# Patient Record
Sex: MALE | Race: WHITE | NOT HISPANIC OR LATINO | URBAN - METROPOLITAN AREA
[De-identification: names, ages, dates, MRNs, and addresses within clinical notes are randomized per-mention and may not be internally consistent; named-entity substitution may affect disease eponyms.]

---

## 2023-10-17 ENCOUNTER — INPATIENT (INPATIENT)
Facility: HOSPITAL | Age: 38
LOS: 4 days | Discharge: HOME | DRG: 897 | End: 2023-10-22
Attending: FAMILY MEDICINE | Admitting: STUDENT IN AN ORGANIZED HEALTH CARE EDUCATION/TRAINING PROGRAM
Payer: COMMERCIAL

## 2023-10-17 VITALS
SYSTOLIC BLOOD PRESSURE: 140 MMHG | HEART RATE: 129 BPM | TEMPERATURE: 98 F | HEIGHT: 68 IN | WEIGHT: 169.98 LBS | DIASTOLIC BLOOD PRESSURE: 84 MMHG | RESPIRATION RATE: 16 BRPM | OXYGEN SATURATION: 94 %

## 2023-10-17 LAB
ALBUMIN SERPL ELPH-MCNC: 4.4 G/DL — SIGNIFICANT CHANGE UP (ref 3.3–5.2)
ALBUMIN SERPL ELPH-MCNC: 4.4 G/DL — SIGNIFICANT CHANGE UP (ref 3.3–5.2)
ALP SERPL-CCNC: 109 U/L — SIGNIFICANT CHANGE UP (ref 40–120)
ALP SERPL-CCNC: 109 U/L — SIGNIFICANT CHANGE UP (ref 40–120)
ALT FLD-CCNC: 48 U/L — HIGH
ALT FLD-CCNC: 48 U/L — HIGH
ANION GAP SERPL CALC-SCNC: 24 MMOL/L — HIGH (ref 5–17)
ANION GAP SERPL CALC-SCNC: 24 MMOL/L — HIGH (ref 5–17)
APAP SERPL-MCNC: <3 UG/ML — LOW (ref 10–26)
APAP SERPL-MCNC: <3 UG/ML — LOW (ref 10–26)
APPEARANCE UR: CLEAR — SIGNIFICANT CHANGE UP
APPEARANCE UR: CLEAR — SIGNIFICANT CHANGE UP
AST SERPL-CCNC: 110 U/L — HIGH
AST SERPL-CCNC: 110 U/L — HIGH
BACTERIA # UR AUTO: ABNORMAL
BACTERIA # UR AUTO: ABNORMAL
BASOPHILS # BLD AUTO: 0.01 K/UL — SIGNIFICANT CHANGE UP (ref 0–0.2)
BASOPHILS # BLD AUTO: 0.01 K/UL — SIGNIFICANT CHANGE UP (ref 0–0.2)
BASOPHILS NFR BLD AUTO: 0.2 % — SIGNIFICANT CHANGE UP (ref 0–2)
BASOPHILS NFR BLD AUTO: 0.2 % — SIGNIFICANT CHANGE UP (ref 0–2)
BILIRUB SERPL-MCNC: 0.6 MG/DL — SIGNIFICANT CHANGE UP (ref 0.4–2)
BILIRUB SERPL-MCNC: 0.6 MG/DL — SIGNIFICANT CHANGE UP (ref 0.4–2)
BILIRUB UR-MCNC: NEGATIVE — SIGNIFICANT CHANGE UP
BILIRUB UR-MCNC: NEGATIVE — SIGNIFICANT CHANGE UP
BUN SERPL-MCNC: 9.9 MG/DL — SIGNIFICANT CHANGE UP (ref 8–20)
BUN SERPL-MCNC: 9.9 MG/DL — SIGNIFICANT CHANGE UP (ref 8–20)
CALCIUM SERPL-MCNC: 8.3 MG/DL — LOW (ref 8.4–10.5)
CALCIUM SERPL-MCNC: 8.3 MG/DL — LOW (ref 8.4–10.5)
CHLORIDE SERPL-SCNC: 87 MMOL/L — LOW (ref 96–108)
CHLORIDE SERPL-SCNC: 87 MMOL/L — LOW (ref 96–108)
CO2 SERPL-SCNC: 21 MMOL/L — LOW (ref 22–29)
CO2 SERPL-SCNC: 21 MMOL/L — LOW (ref 22–29)
COLOR SPEC: YELLOW — SIGNIFICANT CHANGE UP
COLOR SPEC: YELLOW — SIGNIFICANT CHANGE UP
CREAT SERPL-MCNC: 0.79 MG/DL — SIGNIFICANT CHANGE UP (ref 0.5–1.3)
CREAT SERPL-MCNC: 0.79 MG/DL — SIGNIFICANT CHANGE UP (ref 0.5–1.3)
DIFF PNL FLD: ABNORMAL
DIFF PNL FLD: ABNORMAL
EGFR: 117 ML/MIN/1.73M2 — SIGNIFICANT CHANGE UP
EGFR: 117 ML/MIN/1.73M2 — SIGNIFICANT CHANGE UP
EOSINOPHIL # BLD AUTO: 0 K/UL — SIGNIFICANT CHANGE UP (ref 0–0.5)
EOSINOPHIL # BLD AUTO: 0 K/UL — SIGNIFICANT CHANGE UP (ref 0–0.5)
EOSINOPHIL NFR BLD AUTO: 0 % — SIGNIFICANT CHANGE UP (ref 0–6)
EOSINOPHIL NFR BLD AUTO: 0 % — SIGNIFICANT CHANGE UP (ref 0–6)
EPI CELLS # UR: SIGNIFICANT CHANGE UP
EPI CELLS # UR: SIGNIFICANT CHANGE UP
ETHANOL SERPL-MCNC: 336 MG/DL — HIGH (ref 0–9)
ETHANOL SERPL-MCNC: 336 MG/DL — HIGH (ref 0–9)
GLUCOSE SERPL-MCNC: 115 MG/DL — HIGH (ref 70–99)
GLUCOSE SERPL-MCNC: 115 MG/DL — HIGH (ref 70–99)
GLUCOSE UR QL: NEGATIVE MG/DL — SIGNIFICANT CHANGE UP
GLUCOSE UR QL: NEGATIVE MG/DL — SIGNIFICANT CHANGE UP
HCT VFR BLD CALC: 37.9 % — LOW (ref 39–50)
HCT VFR BLD CALC: 37.9 % — LOW (ref 39–50)
HGB BLD-MCNC: 13.6 G/DL — SIGNIFICANT CHANGE UP (ref 13–17)
HGB BLD-MCNC: 13.6 G/DL — SIGNIFICANT CHANGE UP (ref 13–17)
IMM GRANULOCYTES NFR BLD AUTO: 0.2 % — SIGNIFICANT CHANGE UP (ref 0–0.9)
IMM GRANULOCYTES NFR BLD AUTO: 0.2 % — SIGNIFICANT CHANGE UP (ref 0–0.9)
KETONES UR-MCNC: ABNORMAL
KETONES UR-MCNC: ABNORMAL
LEUKOCYTE ESTERASE UR-ACNC: ABNORMAL
LEUKOCYTE ESTERASE UR-ACNC: ABNORMAL
LYMPHOCYTES # BLD AUTO: 1.96 K/UL — SIGNIFICANT CHANGE UP (ref 1–3.3)
LYMPHOCYTES # BLD AUTO: 1.96 K/UL — SIGNIFICANT CHANGE UP (ref 1–3.3)
LYMPHOCYTES # BLD AUTO: 33.2 % — SIGNIFICANT CHANGE UP (ref 13–44)
LYMPHOCYTES # BLD AUTO: 33.2 % — SIGNIFICANT CHANGE UP (ref 13–44)
MCHC RBC-ENTMCNC: 30.8 PG — SIGNIFICANT CHANGE UP (ref 27–34)
MCHC RBC-ENTMCNC: 30.8 PG — SIGNIFICANT CHANGE UP (ref 27–34)
MCHC RBC-ENTMCNC: 35.9 GM/DL — SIGNIFICANT CHANGE UP (ref 32–36)
MCHC RBC-ENTMCNC: 35.9 GM/DL — SIGNIFICANT CHANGE UP (ref 32–36)
MCV RBC AUTO: 85.7 FL — SIGNIFICANT CHANGE UP (ref 80–100)
MCV RBC AUTO: 85.7 FL — SIGNIFICANT CHANGE UP (ref 80–100)
MONOCYTES # BLD AUTO: 0.31 K/UL — SIGNIFICANT CHANGE UP (ref 0–0.9)
MONOCYTES # BLD AUTO: 0.31 K/UL — SIGNIFICANT CHANGE UP (ref 0–0.9)
MONOCYTES NFR BLD AUTO: 5.2 % — SIGNIFICANT CHANGE UP (ref 2–14)
MONOCYTES NFR BLD AUTO: 5.2 % — SIGNIFICANT CHANGE UP (ref 2–14)
NEUTROPHILS # BLD AUTO: 3.62 K/UL — SIGNIFICANT CHANGE UP (ref 1.8–7.4)
NEUTROPHILS # BLD AUTO: 3.62 K/UL — SIGNIFICANT CHANGE UP (ref 1.8–7.4)
NEUTROPHILS NFR BLD AUTO: 61.2 % — SIGNIFICANT CHANGE UP (ref 43–77)
NEUTROPHILS NFR BLD AUTO: 61.2 % — SIGNIFICANT CHANGE UP (ref 43–77)
NITRITE UR-MCNC: NEGATIVE — SIGNIFICANT CHANGE UP
NITRITE UR-MCNC: NEGATIVE — SIGNIFICANT CHANGE UP
PH UR: 6 — SIGNIFICANT CHANGE UP (ref 5–8)
PH UR: 6 — SIGNIFICANT CHANGE UP (ref 5–8)
PLATELET # BLD AUTO: 214 K/UL — SIGNIFICANT CHANGE UP (ref 150–400)
PLATELET # BLD AUTO: 214 K/UL — SIGNIFICANT CHANGE UP (ref 150–400)
POTASSIUM SERPL-MCNC: 4.3 MMOL/L — SIGNIFICANT CHANGE UP (ref 3.5–5.3)
POTASSIUM SERPL-MCNC: 4.3 MMOL/L — SIGNIFICANT CHANGE UP (ref 3.5–5.3)
POTASSIUM SERPL-SCNC: 4.3 MMOL/L — SIGNIFICANT CHANGE UP (ref 3.5–5.3)
POTASSIUM SERPL-SCNC: 4.3 MMOL/L — SIGNIFICANT CHANGE UP (ref 3.5–5.3)
PROT SERPL-MCNC: 7.8 G/DL — SIGNIFICANT CHANGE UP (ref 6.6–8.7)
PROT SERPL-MCNC: 7.8 G/DL — SIGNIFICANT CHANGE UP (ref 6.6–8.7)
PROT UR-MCNC: 500 MG/DL
PROT UR-MCNC: 500 MG/DL
RBC # BLD: 4.42 M/UL — SIGNIFICANT CHANGE UP (ref 4.2–5.8)
RBC # BLD: 4.42 M/UL — SIGNIFICANT CHANGE UP (ref 4.2–5.8)
RBC # FLD: 13.3 % — SIGNIFICANT CHANGE UP (ref 10.3–14.5)
RBC # FLD: 13.3 % — SIGNIFICANT CHANGE UP (ref 10.3–14.5)
RBC CASTS # UR COMP ASSIST: ABNORMAL /HPF (ref 0–4)
RBC CASTS # UR COMP ASSIST: ABNORMAL /HPF (ref 0–4)
SALICYLATES SERPL-MCNC: <0.6 MG/DL — LOW (ref 10–20)
SALICYLATES SERPL-MCNC: <0.6 MG/DL — LOW (ref 10–20)
SODIUM SERPL-SCNC: 132 MMOL/L — LOW (ref 135–145)
SODIUM SERPL-SCNC: 132 MMOL/L — LOW (ref 135–145)
SP GR SPEC: 1.02 — SIGNIFICANT CHANGE UP (ref 1.01–1.02)
SP GR SPEC: 1.02 — SIGNIFICANT CHANGE UP (ref 1.01–1.02)
UROBILINOGEN FLD QL: NEGATIVE MG/DL — SIGNIFICANT CHANGE UP
UROBILINOGEN FLD QL: NEGATIVE MG/DL — SIGNIFICANT CHANGE UP
WBC # BLD: 5.91 K/UL — SIGNIFICANT CHANGE UP (ref 3.8–10.5)
WBC # BLD: 5.91 K/UL — SIGNIFICANT CHANGE UP (ref 3.8–10.5)
WBC # FLD AUTO: 5.91 K/UL — SIGNIFICANT CHANGE UP (ref 3.8–10.5)
WBC # FLD AUTO: 5.91 K/UL — SIGNIFICANT CHANGE UP (ref 3.8–10.5)
WBC UR QL: SIGNIFICANT CHANGE UP /HPF (ref 0–5)
WBC UR QL: SIGNIFICANT CHANGE UP /HPF (ref 0–5)

## 2023-10-17 PROCEDURE — 99291 CRITICAL CARE FIRST HOUR: CPT

## 2023-10-17 PROCEDURE — 93010 ELECTROCARDIOGRAM REPORT: CPT

## 2023-10-17 RX ORDER — DIAZEPAM 5 MG
10 TABLET ORAL ONCE
Refills: 0 | Status: DISCONTINUED | OUTPATIENT
Start: 2023-10-17 | End: 2023-10-17

## 2023-10-17 RX ORDER — DIAZEPAM 5 MG
10 TABLET ORAL EVERY 4 HOURS
Refills: 0 | Status: DISCONTINUED | OUTPATIENT
Start: 2023-10-17 | End: 2023-10-21

## 2023-10-17 RX ADMIN — Medication 50 MILLIGRAM(S): at 22:16

## 2023-10-17 RX ADMIN — Medication 10 MILLIGRAM(S): at 22:16

## 2023-10-17 RX ADMIN — Medication 50 MILLIGRAM(S): at 19:54

## 2023-10-17 NOTE — ED PROVIDER NOTE - CLINICAL SUMMARY MEDICAL DECISION MAKING FREE TEXT BOX
Pt likely with mild to moderate symptoms related to etoh withdrawal, will check labs, provide medications and reassess to see if patient wants detox Pt likely with mild to moderate symptoms related to etoh withdrawal, will check labs, provide medications and reassess to see if patient wants detox    Patient found to have a significantly elevated alcohol level and persistent symptoms consistent with acute and significant alcohol withdrawal. Patient requiring repeated doses of benzodiazepine to control symptoms. Will admit for further management. Pt likely with mild to moderate symptoms related to etoh withdrawal, will check labs, provide medications and reassess to see if patient wants detox    Patient found to have a significantly elevated alcohol level and persistent symptoms consistent with acute and significant alcohol withdrawal. On repeated evaluations patient with complaints of mild relief with medication and still having anxiety symptoms. Patient requiring repeated doses of benzodiazepine to control symptoms. Will admit for further management.

## 2023-10-17 NOTE — ED PROVIDER NOTE - CONSTITUTIONAL, MLM
anxious appearing, awake, alert, oriented to person, place, time/situation and in no apparent distress. normal... anxious appearing, awake, alert, oriented to person, place, time/situation and very anxious appearing

## 2023-10-17 NOTE — ED PROVIDER NOTE - PROGRESS NOTE DETAILS
Patient states he felt a little better after the initial librium dose but now feels even worse with " a lot of anxiety." He denies any additional symptoms.

## 2023-10-17 NOTE — ED ADULT TRIAGE NOTE - CHIEF COMPLAINT QUOTE
Pt A&Ox4 states "I drank a lot because my wife is cheating." BIBA c/o alcohol intoxication. Patient denies SI/HI. Placed in yellow gown, belongings secured.

## 2023-10-17 NOTE — ED PROVIDER NOTE - OBJECTIVE STATEMENT
37 y/o male presents to the ED stating he believe he has been having panic attack for several hours, notes hx of depression and etoh abuse. Pt endorses that he has several year hx of intermittent etoh abuse and has been on 3 day binge, last drink approximately 5-7 hours PTA. Pt states he feels anxious, dizzy, mildly nauseous, no vomiting. Pt denies fevers, chills, cough, congestion, other substance use. Pt offered detox, at this time declining and requesting meds for panic attack and he will "think about it". Pt denies SI, HI, AH, VH, abnormal sensation.

## 2023-10-17 NOTE — ED ADULT NURSE NOTE - OBJECTIVE STATEMENT
Pt to ED c/o anxiety & depression. Pt has reportedly been drinking an unknown amount of ETOH since Friday. Last drink "sometime today." Pt unable to specify timeframe of last drink today. Pt states he has not been taking his medications including his anxiety medication. Pt denies SI/HI. Pt states "I'm going through a lot. I lost my parents & my brother." Pt tearful. Wife @ bedside. Pt A&Ox4, in NAD @ this time. Pt denies n/v, diaphoresis, pins/needles, auditory/visual hallucinations.

## 2023-10-17 NOTE — ED PROVIDER NOTE - PSYCHIATRIC, MLM
Alert and oriented to person, place, time/situation. anxious, following commands, no si, no hi, no hallucinations

## 2023-10-17 NOTE — ED ADULT NURSE NOTE - NSFALLHARMRISKINTERV_ED_ALL_ED

## 2023-10-18 DIAGNOSIS — F10.239 ALCOHOL DEPENDENCE WITH WITHDRAWAL, UNSPECIFIED: ICD-10-CM
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
## 2023-10-18 LAB
ALBUMIN SERPL ELPH-MCNC: 4.1 G/DL — SIGNIFICANT CHANGE UP (ref 3.3–5.2)
ALBUMIN SERPL ELPH-MCNC: 4.1 G/DL — SIGNIFICANT CHANGE UP (ref 3.3–5.2)
ALP SERPL-CCNC: 106 U/L — SIGNIFICANT CHANGE UP (ref 40–120)
ALP SERPL-CCNC: 106 U/L — SIGNIFICANT CHANGE UP (ref 40–120)
ALT FLD-CCNC: 52 U/L — HIGH
ALT FLD-CCNC: 52 U/L — HIGH
ANION GAP SERPL CALC-SCNC: 18 MMOL/L — HIGH (ref 5–17)
ANION GAP SERPL CALC-SCNC: 18 MMOL/L — HIGH (ref 5–17)
AST SERPL-CCNC: 119 U/L — HIGH
AST SERPL-CCNC: 119 U/L — HIGH
BILIRUB SERPL-MCNC: 1.2 MG/DL — SIGNIFICANT CHANGE UP (ref 0.4–2)
BILIRUB SERPL-MCNC: 1.2 MG/DL — SIGNIFICANT CHANGE UP (ref 0.4–2)
BUN SERPL-MCNC: 8.8 MG/DL — SIGNIFICANT CHANGE UP (ref 8–20)
BUN SERPL-MCNC: 8.8 MG/DL — SIGNIFICANT CHANGE UP (ref 8–20)
CALCIUM SERPL-MCNC: 8 MG/DL — LOW (ref 8.4–10.5)
CALCIUM SERPL-MCNC: 8 MG/DL — LOW (ref 8.4–10.5)
CHLORIDE SERPL-SCNC: 91 MMOL/L — LOW (ref 96–108)
CHLORIDE SERPL-SCNC: 91 MMOL/L — LOW (ref 96–108)
CO2 SERPL-SCNC: 24 MMOL/L — SIGNIFICANT CHANGE UP (ref 22–29)
CO2 SERPL-SCNC: 24 MMOL/L — SIGNIFICANT CHANGE UP (ref 22–29)
CREAT SERPL-MCNC: 0.83 MG/DL — SIGNIFICANT CHANGE UP (ref 0.5–1.3)
CREAT SERPL-MCNC: 0.83 MG/DL — SIGNIFICANT CHANGE UP (ref 0.5–1.3)
EGFR: 115 ML/MIN/1.73M2 — SIGNIFICANT CHANGE UP
EGFR: 115 ML/MIN/1.73M2 — SIGNIFICANT CHANGE UP
GLUCOSE SERPL-MCNC: 86 MG/DL — SIGNIFICANT CHANGE UP (ref 70–99)
GLUCOSE SERPL-MCNC: 86 MG/DL — SIGNIFICANT CHANGE UP (ref 70–99)
MAGNESIUM SERPL-MCNC: 1.9 MG/DL — SIGNIFICANT CHANGE UP (ref 1.6–2.6)
MAGNESIUM SERPL-MCNC: 1.9 MG/DL — SIGNIFICANT CHANGE UP (ref 1.6–2.6)
PHOSPHATE SERPL-MCNC: 3 MG/DL — SIGNIFICANT CHANGE UP (ref 2.4–4.7)
PHOSPHATE SERPL-MCNC: 3 MG/DL — SIGNIFICANT CHANGE UP (ref 2.4–4.7)
POTASSIUM SERPL-MCNC: 3.5 MMOL/L — SIGNIFICANT CHANGE UP (ref 3.5–5.3)
POTASSIUM SERPL-MCNC: 3.5 MMOL/L — SIGNIFICANT CHANGE UP (ref 3.5–5.3)
POTASSIUM SERPL-SCNC: 3.5 MMOL/L — SIGNIFICANT CHANGE UP (ref 3.5–5.3)
POTASSIUM SERPL-SCNC: 3.5 MMOL/L — SIGNIFICANT CHANGE UP (ref 3.5–5.3)
PROT SERPL-MCNC: 6.9 G/DL — SIGNIFICANT CHANGE UP (ref 6.6–8.7)
PROT SERPL-MCNC: 6.9 G/DL — SIGNIFICANT CHANGE UP (ref 6.6–8.7)
SODIUM SERPL-SCNC: 133 MMOL/L — LOW (ref 135–145)
SODIUM SERPL-SCNC: 133 MMOL/L — LOW (ref 135–145)
TROPONIN T SERPL-MCNC: <0.01 NG/ML — SIGNIFICANT CHANGE UP (ref 0–0.06)
TROPONIN T SERPL-MCNC: <0.01 NG/ML — SIGNIFICANT CHANGE UP (ref 0–0.06)

## 2023-10-18 PROCEDURE — 93010 ELECTROCARDIOGRAM REPORT: CPT

## 2023-10-18 PROCEDURE — 99222 1ST HOSP IP/OBS MODERATE 55: CPT

## 2023-10-18 RX ORDER — ONDANSETRON 8 MG/1
4 TABLET, FILM COATED ORAL EVERY 8 HOURS
Refills: 0 | Status: DISCONTINUED | OUTPATIENT
Start: 2023-10-18 | End: 2023-10-22

## 2023-10-18 RX ORDER — PHENOBARBITAL 60 MG
130 TABLET ORAL ONCE
Refills: 0 | Status: DISCONTINUED | OUTPATIENT
Start: 2023-10-18 | End: 2023-10-18

## 2023-10-18 RX ORDER — DIAZEPAM 5 MG
10 TABLET ORAL ONCE
Refills: 0 | Status: DISCONTINUED | OUTPATIENT
Start: 2023-10-18 | End: 2023-10-18

## 2023-10-18 RX ORDER — PANTOPRAZOLE SODIUM 20 MG/1
40 TABLET, DELAYED RELEASE ORAL
Refills: 0 | Status: DISCONTINUED | OUTPATIENT
Start: 2023-10-18 | End: 2023-10-22

## 2023-10-18 RX ORDER — QUETIAPINE FUMARATE 200 MG/1
50 TABLET, FILM COATED ORAL DAILY
Refills: 0 | Status: DISCONTINUED | OUTPATIENT
Start: 2023-10-18 | End: 2023-10-19

## 2023-10-18 RX ORDER — SODIUM CHLORIDE 9 MG/ML
1000 INJECTION INTRAMUSCULAR; INTRAVENOUS; SUBCUTANEOUS ONCE
Refills: 0 | Status: COMPLETED | OUTPATIENT
Start: 2023-10-18 | End: 2023-10-18

## 2023-10-18 RX ORDER — ACETAMINOPHEN 500 MG
650 TABLET ORAL EVERY 6 HOURS
Refills: 0 | Status: DISCONTINUED | OUTPATIENT
Start: 2023-10-18 | End: 2023-10-22

## 2023-10-18 RX ORDER — SODIUM CHLORIDE 9 MG/ML
1000 INJECTION, SOLUTION INTRAVENOUS
Refills: 0 | Status: DISCONTINUED | OUTPATIENT
Start: 2023-10-18 | End: 2023-10-20

## 2023-10-18 RX ORDER — SODIUM CHLORIDE 9 MG/ML
3 INJECTION INTRAMUSCULAR; INTRAVENOUS; SUBCUTANEOUS EVERY 8 HOURS
Refills: 0 | Status: DISCONTINUED | OUTPATIENT
Start: 2023-10-18 | End: 2023-10-22

## 2023-10-18 RX ORDER — METOPROLOL TARTRATE 50 MG
25 TABLET ORAL
Refills: 0 | Status: DISCONTINUED | OUTPATIENT
Start: 2023-10-18 | End: 2023-10-22

## 2023-10-18 RX ORDER — POTASSIUM CHLORIDE 20 MEQ
40 PACKET (EA) ORAL EVERY 4 HOURS
Refills: 0 | Status: COMPLETED | OUTPATIENT
Start: 2023-10-18 | End: 2023-10-18

## 2023-10-18 RX ORDER — LANOLIN ALCOHOL/MO/W.PET/CERES
3 CREAM (GRAM) TOPICAL AT BEDTIME
Refills: 0 | Status: DISCONTINUED | OUTPATIENT
Start: 2023-10-18 | End: 2023-10-22

## 2023-10-18 RX ORDER — THIAMINE MONONITRATE (VIT B1) 100 MG
100 TABLET ORAL DAILY
Refills: 0 | Status: COMPLETED | OUTPATIENT
Start: 2023-10-18 | End: 2023-10-20

## 2023-10-18 RX ORDER — FOLIC ACID 0.8 MG
1 TABLET ORAL DAILY
Refills: 0 | Status: DISCONTINUED | OUTPATIENT
Start: 2023-10-18 | End: 2023-10-22

## 2023-10-18 RX ORDER — INFLUENZA VIRUS VACCINE 15; 15; 15; 15 UG/.5ML; UG/.5ML; UG/.5ML; UG/.5ML
0.5 SUSPENSION INTRAMUSCULAR ONCE
Refills: 0 | Status: DISCONTINUED | OUTPATIENT
Start: 2023-10-18 | End: 2023-10-22

## 2023-10-18 RX ADMIN — Medication 130 MILLIGRAM(S): at 03:23

## 2023-10-18 RX ADMIN — Medication 10 MILLIGRAM(S): at 18:08

## 2023-10-18 RX ADMIN — SODIUM CHLORIDE 3 MILLILITER(S): 9 INJECTION INTRAMUSCULAR; INTRAVENOUS; SUBCUTANEOUS at 11:40

## 2023-10-18 RX ADMIN — SODIUM CHLORIDE 3 MILLILITER(S): 9 INJECTION INTRAMUSCULAR; INTRAVENOUS; SUBCUTANEOUS at 05:56

## 2023-10-18 RX ADMIN — Medication 10 MILLIGRAM(S): at 08:23

## 2023-10-18 RX ADMIN — Medication 10 MILLIGRAM(S): at 03:23

## 2023-10-18 RX ADMIN — Medication 50 MILLIGRAM(S): at 06:21

## 2023-10-18 RX ADMIN — Medication 50 MILLIGRAM(S): at 16:21

## 2023-10-18 RX ADMIN — QUETIAPINE FUMARATE 50 MILLIGRAM(S): 200 TABLET, FILM COATED ORAL at 11:34

## 2023-10-18 RX ADMIN — SODIUM CHLORIDE 3 MILLILITER(S): 9 INJECTION INTRAMUSCULAR; INTRAVENOUS; SUBCUTANEOUS at 21:44

## 2023-10-18 RX ADMIN — Medication 25 MILLIGRAM(S): at 05:43

## 2023-10-18 RX ADMIN — Medication 10 MILLIGRAM(S): at 05:44

## 2023-10-18 RX ADMIN — Medication 40 MILLIEQUIVALENT(S): at 13:52

## 2023-10-18 RX ADMIN — Medication 10 MILLIGRAM(S): at 00:41

## 2023-10-18 RX ADMIN — PANTOPRAZOLE SODIUM 40 MILLIGRAM(S): 20 TABLET, DELAYED RELEASE ORAL at 06:21

## 2023-10-18 RX ADMIN — Medication 25 MILLIGRAM(S): at 16:21

## 2023-10-18 RX ADMIN — Medication 10 MILLIGRAM(S): at 13:52

## 2023-10-18 RX ADMIN — Medication 50 MILLIGRAM(S): at 05:43

## 2023-10-18 RX ADMIN — Medication 50 MILLIGRAM(S): at 11:34

## 2023-10-18 RX ADMIN — Medication 40 MILLIEQUIVALENT(S): at 11:34

## 2023-10-18 RX ADMIN — Medication 10 MILLIGRAM(S): at 22:08

## 2023-10-18 RX ADMIN — SODIUM CHLORIDE 125 MILLILITER(S): 9 INJECTION, SOLUTION INTRAVENOUS at 06:21

## 2023-10-18 RX ADMIN — Medication 1 MILLIGRAM(S): at 08:23

## 2023-10-18 RX ADMIN — SODIUM CHLORIDE 1000 MILLILITER(S): 9 INJECTION INTRAMUSCULAR; INTRAVENOUS; SUBCUTANEOUS at 00:42

## 2023-10-18 RX ADMIN — Medication 1 TABLET(S): at 08:23

## 2023-10-18 RX ADMIN — Medication 10 MILLIGRAM(S): at 19:59

## 2023-10-18 RX ADMIN — Medication 100 MILLIGRAM(S): at 08:23

## 2023-10-18 NOTE — H&P ADULT - ASSESSMENT
37 y/o male with etoh intoxication/withdrawal, etoh ketoacidosis, HTN, Anxiety, GERD    Etoh intoxication/withdrawal:  -Admit to tele  -CIWA protocol with librium taper and PRN valium for CIWA >8  -FA, MVI, Thiamine for possible thiamine deficiency   -Fall/seizure protocol  -Low threshold for MICU eval with signs of withdrawal in setting of elevated etoh level   -PPI  -Check daily lytes  - for [facility] referral   -VC boots while in bed    etoh ketoacidosis:  -Cont. IV/PO intake  -Repeat BMP in AM    HTN:  -DASH diet  -Cont. Metoprolol     Anxiety:  -Cont. Sertraline   -Denies SI/HI    GERD:  -PPI    Discussed with ED staff, Patient

## 2023-10-18 NOTE — PROVIDER CONTACT NOTE (OTHER) - ASSESSMENT
CIWA 8, pt reports anxiety 8/10, last [id number] given at 1808, pt unable to wait until 10pm dose for PRN 10 valium, librium taper ended

## 2023-10-18 NOTE — H&P ADULT - HISTORY OF PRESENT ILLNESS
37 y/o male with hx of etoh use disorder, HTN, Anxiety who was brought in by EMS after his Wife called 911 concerned about him staying in a hotel for the past 3 days drinking excessively. Patient states he had a falling out with his Wife and has been under a lot of stress lately. He states he has been drinking of and off for years trying to cut back recently to only on weekends. States he usually drinks a bottle a wine when he does drink. He denies any hard liquor. Admits to having tremors in the past when he stops drinking. Denies any illicit drug use, has never been in any rehab for his drinking. He denies any hx of SI/HI, and currently is very remorseful and adamantly denies any desire to hurt himself. In the ED he was noted to be tachycardic, and tremulous. Given multiple doses of Valium and Librium with good effect. Initial etoh level 336, and labs c/w dehydration and alcoholic ketoacidosis.

## 2023-10-18 NOTE — PATIENT PROFILE ADULT - FALL HARM RISK - HARM RISK INTERVENTIONS
Assistance with ambulation/Assistance OOB with selected safe patient handling equipment/Communicate Risk of Fall with Harm to all staff/Monitor for mental status changes/Monitor gait and stability/Reinforce activity limits and safety measures with patient and family/Tailored Fall Risk Interventions/Toileting schedule using arm’s reach rule for commode and bathroom/Use of alarms - bed, chair and/or voice tab/Visual Cue: Yellow wristband and red socks/Bed in lowest position, wheels locked, appropriate side rails in place/Call bell, personal items and telephone in reach/Instruct patient to call for assistance before getting out of bed or chair/Non-slip footwear when patient is out of bed/[place] to call system/Physically safe environment - no spills, clutter or unnecessary equipment/Purposeful Proactive Rounding/Room/bathroom lighting operational, light cord in reach

## 2023-10-19 LAB
ALBUMIN SERPL ELPH-MCNC: 4.1 G/DL — SIGNIFICANT CHANGE UP (ref 3.3–5.2)
ALBUMIN SERPL ELPH-MCNC: 4.1 G/DL — SIGNIFICANT CHANGE UP (ref 3.3–5.2)
ALP SERPL-CCNC: 137 U/L — HIGH (ref 40–120)
ALP SERPL-CCNC: 137 U/L — HIGH (ref 40–120)
ALT FLD-CCNC: 62 U/L — HIGH
ALT FLD-CCNC: 62 U/L — HIGH
ANION GAP SERPL CALC-SCNC: 16 MMOL/L — SIGNIFICANT CHANGE UP (ref 5–17)
ANION GAP SERPL CALC-SCNC: 16 MMOL/L — SIGNIFICANT CHANGE UP (ref 5–17)
AST SERPL-CCNC: 144 U/L — HIGH
AST SERPL-CCNC: 144 U/L — HIGH
BILIRUB SERPL-MCNC: 1.3 MG/DL — SIGNIFICANT CHANGE UP (ref 0.4–2)
BILIRUB SERPL-MCNC: 1.3 MG/DL — SIGNIFICANT CHANGE UP (ref 0.4–2)
BUN SERPL-MCNC: 9.5 MG/DL — SIGNIFICANT CHANGE UP (ref 8–20)
BUN SERPL-MCNC: 9.5 MG/DL — SIGNIFICANT CHANGE UP (ref 8–20)
CALCIUM SERPL-MCNC: 8.7 MG/DL — SIGNIFICANT CHANGE UP (ref 8.4–10.5)
CALCIUM SERPL-MCNC: 8.7 MG/DL — SIGNIFICANT CHANGE UP (ref 8.4–10.5)
CHLORIDE SERPL-SCNC: 97 MMOL/L — SIGNIFICANT CHANGE UP (ref 96–108)
CHLORIDE SERPL-SCNC: 97 MMOL/L — SIGNIFICANT CHANGE UP (ref 96–108)
CO2 SERPL-SCNC: 24 MMOL/L — SIGNIFICANT CHANGE UP (ref 22–29)
CO2 SERPL-SCNC: 24 MMOL/L — SIGNIFICANT CHANGE UP (ref 22–29)
CREAT SERPL-MCNC: 0.9 MG/DL — SIGNIFICANT CHANGE UP (ref 0.5–1.3)
CREAT SERPL-MCNC: 0.9 MG/DL — SIGNIFICANT CHANGE UP (ref 0.5–1.3)
EGFR: 112 ML/MIN/1.73M2 — SIGNIFICANT CHANGE UP
EGFR: 112 ML/MIN/1.73M2 — SIGNIFICANT CHANGE UP
GLUCOSE SERPL-MCNC: 115 MG/DL — HIGH (ref 70–99)
GLUCOSE SERPL-MCNC: 115 MG/DL — HIGH (ref 70–99)
HCT VFR BLD CALC: 36.7 % — LOW (ref 39–50)
HCT VFR BLD CALC: 36.7 % — LOW (ref 39–50)
HGB BLD-MCNC: 12.7 G/DL — LOW (ref 13–17)
HGB BLD-MCNC: 12.7 G/DL — LOW (ref 13–17)
MAGNESIUM SERPL-MCNC: 2 MG/DL — SIGNIFICANT CHANGE UP (ref 1.6–2.6)
MAGNESIUM SERPL-MCNC: 2 MG/DL — SIGNIFICANT CHANGE UP (ref 1.6–2.6)
MCHC RBC-ENTMCNC: 30.6 PG — SIGNIFICANT CHANGE UP (ref 27–34)
MCHC RBC-ENTMCNC: 30.6 PG — SIGNIFICANT CHANGE UP (ref 27–34)
MCHC RBC-ENTMCNC: 34.6 GM/DL — SIGNIFICANT CHANGE UP (ref 32–36)
MCHC RBC-ENTMCNC: 34.6 GM/DL — SIGNIFICANT CHANGE UP (ref 32–36)
MCV RBC AUTO: 88.4 FL — SIGNIFICANT CHANGE UP (ref 80–100)
MCV RBC AUTO: 88.4 FL — SIGNIFICANT CHANGE UP (ref 80–100)
PHOSPHATE SERPL-MCNC: 1.7 MG/DL — LOW (ref 2.4–4.7)
PHOSPHATE SERPL-MCNC: 1.7 MG/DL — LOW (ref 2.4–4.7)
PLATELET # BLD AUTO: 146 K/UL — LOW (ref 150–400)
PLATELET # BLD AUTO: 146 K/UL — LOW (ref 150–400)
POTASSIUM SERPL-MCNC: 3.7 MMOL/L — SIGNIFICANT CHANGE UP (ref 3.5–5.3)
POTASSIUM SERPL-MCNC: 3.7 MMOL/L — SIGNIFICANT CHANGE UP (ref 3.5–5.3)
POTASSIUM SERPL-SCNC: 3.7 MMOL/L — SIGNIFICANT CHANGE UP (ref 3.5–5.3)
POTASSIUM SERPL-SCNC: 3.7 MMOL/L — SIGNIFICANT CHANGE UP (ref 3.5–5.3)
PROT SERPL-MCNC: 7.2 G/DL — SIGNIFICANT CHANGE UP (ref 6.6–8.7)
PROT SERPL-MCNC: 7.2 G/DL — SIGNIFICANT CHANGE UP (ref 6.6–8.7)
RBC # BLD: 4.15 M/UL — LOW (ref 4.2–5.8)
RBC # BLD: 4.15 M/UL — LOW (ref 4.2–5.8)
RBC # FLD: 13.2 % — SIGNIFICANT CHANGE UP (ref 10.3–14.5)
RBC # FLD: 13.2 % — SIGNIFICANT CHANGE UP (ref 10.3–14.5)
SODIUM SERPL-SCNC: 137 MMOL/L — SIGNIFICANT CHANGE UP (ref 135–145)
SODIUM SERPL-SCNC: 137 MMOL/L — SIGNIFICANT CHANGE UP (ref 135–145)
WBC # BLD: 4.41 K/UL — SIGNIFICANT CHANGE UP (ref 3.8–10.5)
WBC # BLD: 4.41 K/UL — SIGNIFICANT CHANGE UP (ref 3.8–10.5)
WBC # FLD AUTO: 4.41 K/UL — SIGNIFICANT CHANGE UP (ref 3.8–10.5)
WBC # FLD AUTO: 4.41 K/UL — SIGNIFICANT CHANGE UP (ref 3.8–10.5)

## 2023-10-19 PROCEDURE — 99233 SBSQ HOSP IP/OBS HIGH 50: CPT

## 2023-10-19 PROCEDURE — 93306 TTE W/DOPPLER COMPLETE: CPT | Mod: 26

## 2023-10-19 RX ORDER — TRAZODONE HCL 50 MG
50 TABLET ORAL AT BEDTIME
Refills: 0 | Status: DISCONTINUED | OUTPATIENT
Start: 2023-10-19 | End: 2023-10-22

## 2023-10-19 RX ORDER — SERTRALINE 25 MG/1
50 TABLET, FILM COATED ORAL DAILY
Refills: 0 | Status: DISCONTINUED | OUTPATIENT
Start: 2023-10-19 | End: 2023-10-22

## 2023-10-19 RX ORDER — QUETIAPINE FUMARATE 200 MG/1
50 TABLET, FILM COATED ORAL
Refills: 0 | Status: DISCONTINUED | OUTPATIENT
Start: 2023-10-19 | End: 2023-10-19

## 2023-10-19 RX ORDER — SODIUM,POTASSIUM PHOSPHATES 278-250MG
1 POWDER IN PACKET (EA) ORAL
Refills: 0 | Status: DISCONTINUED | OUTPATIENT
Start: 2023-10-19 | End: 2023-10-20

## 2023-10-19 RX ADMIN — Medication 50 MILLIGRAM(S): at 21:29

## 2023-10-19 RX ADMIN — SODIUM CHLORIDE 3 MILLILITER(S): 9 INJECTION INTRAMUSCULAR; INTRAVENOUS; SUBCUTANEOUS at 11:58

## 2023-10-19 RX ADMIN — PANTOPRAZOLE SODIUM 40 MILLIGRAM(S): 20 TABLET, DELAYED RELEASE ORAL at 05:15

## 2023-10-19 RX ADMIN — QUETIAPINE FUMARATE 50 MILLIGRAM(S): 200 TABLET, FILM COATED ORAL at 14:18

## 2023-10-19 RX ADMIN — Medication 2 MILLIGRAM(S): at 09:31

## 2023-10-19 RX ADMIN — Medication 2 MILLIGRAM(S): at 00:22

## 2023-10-19 RX ADMIN — Medication 1 TABLET(S): at 09:32

## 2023-10-19 RX ADMIN — Medication 1 TABLET(S): at 14:18

## 2023-10-19 RX ADMIN — SODIUM CHLORIDE 75 MILLILITER(S): 9 INJECTION, SOLUTION INTRAVENOUS at 22:43

## 2023-10-19 RX ADMIN — Medication 2 MILLIGRAM(S): at 17:35

## 2023-10-19 RX ADMIN — Medication 25 MILLIGRAM(S): at 05:15

## 2023-10-19 RX ADMIN — Medication 100 MILLIGRAM(S): at 09:31

## 2023-10-19 RX ADMIN — Medication 2 MILLIGRAM(S): at 03:45

## 2023-10-19 RX ADMIN — SODIUM CHLORIDE 3 MILLILITER(S): 9 INJECTION INTRAMUSCULAR; INTRAVENOUS; SUBCUTANEOUS at 07:28

## 2023-10-19 RX ADMIN — Medication 650 MILLIGRAM(S): at 17:35

## 2023-10-19 RX ADMIN — Medication 1 TABLET(S): at 09:31

## 2023-10-19 RX ADMIN — Medication 50 MILLIGRAM(S): at 05:15

## 2023-10-19 RX ADMIN — Medication 10 MILLIGRAM(S): at 02:08

## 2023-10-19 RX ADMIN — Medication 1 MILLIGRAM(S): at 09:31

## 2023-10-19 RX ADMIN — Medication 1 TABLET(S): at 17:44

## 2023-10-19 RX ADMIN — SODIUM CHLORIDE 75 MILLILITER(S): 9 INJECTION, SOLUTION INTRAVENOUS at 17:07

## 2023-10-19 RX ADMIN — SODIUM CHLORIDE 125 MILLILITER(S): 9 INJECTION, SOLUTION INTRAVENOUS at 05:15

## 2023-10-19 RX ADMIN — Medication 650 MILLIGRAM(S): at 18:33

## 2023-10-19 RX ADMIN — Medication 50 MILLIGRAM(S): at 14:17

## 2023-10-19 RX ADMIN — SODIUM CHLORIDE 3 MILLILITER(S): 9 INJECTION INTRAMUSCULAR; INTRAVENOUS; SUBCUTANEOUS at 21:32

## 2023-10-19 RX ADMIN — Medication 25 MILLIGRAM(S): at 17:35

## 2023-10-20 LAB
ALBUMIN SERPL ELPH-MCNC: 3.7 G/DL — SIGNIFICANT CHANGE UP (ref 3.3–5.2)
ALBUMIN SERPL ELPH-MCNC: 3.7 G/DL — SIGNIFICANT CHANGE UP (ref 3.3–5.2)
ALP SERPL-CCNC: 116 U/L — SIGNIFICANT CHANGE UP (ref 40–120)
ALP SERPL-CCNC: 116 U/L — SIGNIFICANT CHANGE UP (ref 40–120)
ALT FLD-CCNC: 53 U/L — HIGH
ALT FLD-CCNC: 53 U/L — HIGH
ANION GAP SERPL CALC-SCNC: 12 MMOL/L — SIGNIFICANT CHANGE UP (ref 5–17)
ANION GAP SERPL CALC-SCNC: 12 MMOL/L — SIGNIFICANT CHANGE UP (ref 5–17)
AST SERPL-CCNC: 82 U/L — HIGH
AST SERPL-CCNC: 82 U/L — HIGH
BASOPHILS # BLD AUTO: 0.01 K/UL — SIGNIFICANT CHANGE UP (ref 0–0.2)
BASOPHILS # BLD AUTO: 0.01 K/UL — SIGNIFICANT CHANGE UP (ref 0–0.2)
BASOPHILS NFR BLD AUTO: 0.2 % — SIGNIFICANT CHANGE UP (ref 0–2)
BASOPHILS NFR BLD AUTO: 0.2 % — SIGNIFICANT CHANGE UP (ref 0–2)
BILIRUB SERPL-MCNC: 0.7 MG/DL — SIGNIFICANT CHANGE UP (ref 0.4–2)
BILIRUB SERPL-MCNC: 0.7 MG/DL — SIGNIFICANT CHANGE UP (ref 0.4–2)
BUN SERPL-MCNC: 7.5 MG/DL — LOW (ref 8–20)
BUN SERPL-MCNC: 7.5 MG/DL — LOW (ref 8–20)
CALCIUM SERPL-MCNC: 8.7 MG/DL — SIGNIFICANT CHANGE UP (ref 8.4–10.5)
CALCIUM SERPL-MCNC: 8.7 MG/DL — SIGNIFICANT CHANGE UP (ref 8.4–10.5)
CHLORIDE SERPL-SCNC: 105 MMOL/L — SIGNIFICANT CHANGE UP (ref 96–108)
CHLORIDE SERPL-SCNC: 105 MMOL/L — SIGNIFICANT CHANGE UP (ref 96–108)
CO2 SERPL-SCNC: 25 MMOL/L — SIGNIFICANT CHANGE UP (ref 22–29)
CO2 SERPL-SCNC: 25 MMOL/L — SIGNIFICANT CHANGE UP (ref 22–29)
CREAT SERPL-MCNC: 0.86 MG/DL — SIGNIFICANT CHANGE UP (ref 0.5–1.3)
CREAT SERPL-MCNC: 0.86 MG/DL — SIGNIFICANT CHANGE UP (ref 0.5–1.3)
EGFR: 114 ML/MIN/1.73M2 — SIGNIFICANT CHANGE UP
EGFR: 114 ML/MIN/1.73M2 — SIGNIFICANT CHANGE UP
EOSINOPHIL # BLD AUTO: 0.17 K/UL — SIGNIFICANT CHANGE UP (ref 0–0.5)
EOSINOPHIL # BLD AUTO: 0.17 K/UL — SIGNIFICANT CHANGE UP (ref 0–0.5)
EOSINOPHIL NFR BLD AUTO: 4.2 % — SIGNIFICANT CHANGE UP (ref 0–6)
EOSINOPHIL NFR BLD AUTO: 4.2 % — SIGNIFICANT CHANGE UP (ref 0–6)
GLUCOSE SERPL-MCNC: 98 MG/DL — SIGNIFICANT CHANGE UP (ref 70–99)
GLUCOSE SERPL-MCNC: 98 MG/DL — SIGNIFICANT CHANGE UP (ref 70–99)
HCT VFR BLD CALC: 36.7 % — LOW (ref 39–50)
HCT VFR BLD CALC: 36.7 % — LOW (ref 39–50)
HGB BLD-MCNC: 12.3 G/DL — LOW (ref 13–17)
HGB BLD-MCNC: 12.3 G/DL — LOW (ref 13–17)
IMM GRANULOCYTES NFR BLD AUTO: 0.2 % — SIGNIFICANT CHANGE UP (ref 0–0.9)
IMM GRANULOCYTES NFR BLD AUTO: 0.2 % — SIGNIFICANT CHANGE UP (ref 0–0.9)
INR BLD: 0.93 RATIO — SIGNIFICANT CHANGE UP (ref 0.85–1.18)
INR BLD: 0.93 RATIO — SIGNIFICANT CHANGE UP (ref 0.85–1.18)
LYMPHOCYTES # BLD AUTO: 1.9 K/UL — SIGNIFICANT CHANGE UP (ref 1–3.3)
LYMPHOCYTES # BLD AUTO: 1.9 K/UL — SIGNIFICANT CHANGE UP (ref 1–3.3)
LYMPHOCYTES # BLD AUTO: 47.3 % — HIGH (ref 13–44)
LYMPHOCYTES # BLD AUTO: 47.3 % — HIGH (ref 13–44)
MAGNESIUM SERPL-MCNC: 2 MG/DL — SIGNIFICANT CHANGE UP (ref 1.6–2.6)
MAGNESIUM SERPL-MCNC: 2 MG/DL — SIGNIFICANT CHANGE UP (ref 1.6–2.6)
MCHC RBC-ENTMCNC: 30.7 PG — SIGNIFICANT CHANGE UP (ref 27–34)
MCHC RBC-ENTMCNC: 30.7 PG — SIGNIFICANT CHANGE UP (ref 27–34)
MCHC RBC-ENTMCNC: 33.5 GM/DL — SIGNIFICANT CHANGE UP (ref 32–36)
MCHC RBC-ENTMCNC: 33.5 GM/DL — SIGNIFICANT CHANGE UP (ref 32–36)
MCV RBC AUTO: 91.5 FL — SIGNIFICANT CHANGE UP (ref 80–100)
MCV RBC AUTO: 91.5 FL — SIGNIFICANT CHANGE UP (ref 80–100)
MONOCYTES # BLD AUTO: 0.35 K/UL — SIGNIFICANT CHANGE UP (ref 0–0.9)
MONOCYTES # BLD AUTO: 0.35 K/UL — SIGNIFICANT CHANGE UP (ref 0–0.9)
MONOCYTES NFR BLD AUTO: 8.7 % — SIGNIFICANT CHANGE UP (ref 2–14)
MONOCYTES NFR BLD AUTO: 8.7 % — SIGNIFICANT CHANGE UP (ref 2–14)
NEUTROPHILS # BLD AUTO: 1.58 K/UL — LOW (ref 1.8–7.4)
NEUTROPHILS # BLD AUTO: 1.58 K/UL — LOW (ref 1.8–7.4)
NEUTROPHILS NFR BLD AUTO: 39.4 % — LOW (ref 43–77)
NEUTROPHILS NFR BLD AUTO: 39.4 % — LOW (ref 43–77)
PHOSPHATE SERPL-MCNC: 4.8 MG/DL — HIGH (ref 2.4–4.7)
PHOSPHATE SERPL-MCNC: 4.8 MG/DL — HIGH (ref 2.4–4.7)
PLATELET # BLD AUTO: 114 K/UL — LOW (ref 150–400)
PLATELET # BLD AUTO: 114 K/UL — LOW (ref 150–400)
POTASSIUM SERPL-MCNC: 3.6 MMOL/L — SIGNIFICANT CHANGE UP (ref 3.5–5.3)
POTASSIUM SERPL-MCNC: 3.6 MMOL/L — SIGNIFICANT CHANGE UP (ref 3.5–5.3)
POTASSIUM SERPL-SCNC: 3.6 MMOL/L — SIGNIFICANT CHANGE UP (ref 3.5–5.3)
POTASSIUM SERPL-SCNC: 3.6 MMOL/L — SIGNIFICANT CHANGE UP (ref 3.5–5.3)
PROT SERPL-MCNC: 6.9 G/DL — SIGNIFICANT CHANGE UP (ref 6.6–8.7)
PROT SERPL-MCNC: 6.9 G/DL — SIGNIFICANT CHANGE UP (ref 6.6–8.7)
PROTHROM AB SERPL-ACNC: 10.3 SEC — SIGNIFICANT CHANGE UP (ref 9.5–13)
PROTHROM AB SERPL-ACNC: 10.3 SEC — SIGNIFICANT CHANGE UP (ref 9.5–13)
RBC # BLD: 4.01 M/UL — LOW (ref 4.2–5.8)
RBC # BLD: 4.01 M/UL — LOW (ref 4.2–5.8)
RBC # FLD: 13.3 % — SIGNIFICANT CHANGE UP (ref 10.3–14.5)
RBC # FLD: 13.3 % — SIGNIFICANT CHANGE UP (ref 10.3–14.5)
SODIUM SERPL-SCNC: 142 MMOL/L — SIGNIFICANT CHANGE UP (ref 135–145)
SODIUM SERPL-SCNC: 142 MMOL/L — SIGNIFICANT CHANGE UP (ref 135–145)
WBC # BLD: 4.02 K/UL — SIGNIFICANT CHANGE UP (ref 3.8–10.5)
WBC # BLD: 4.02 K/UL — SIGNIFICANT CHANGE UP (ref 3.8–10.5)
WBC # FLD AUTO: 4.02 K/UL — SIGNIFICANT CHANGE UP (ref 3.8–10.5)
WBC # FLD AUTO: 4.02 K/UL — SIGNIFICANT CHANGE UP (ref 3.8–10.5)

## 2023-10-20 PROCEDURE — 99232 SBSQ HOSP IP/OBS MODERATE 35: CPT

## 2023-10-20 RX ORDER — POTASSIUM CHLORIDE 20 MEQ
40 PACKET (EA) ORAL ONCE
Refills: 0 | Status: COMPLETED | OUTPATIENT
Start: 2023-10-20 | End: 2023-10-20

## 2023-10-20 RX ORDER — THIAMINE MONONITRATE (VIT B1) 100 MG
100 TABLET ORAL DAILY
Refills: 0 | Status: DISCONTINUED | OUTPATIENT
Start: 2023-10-21 | End: 2023-10-22

## 2023-10-20 RX ORDER — THIAMINE MONONITRATE (VIT B1) 100 MG
1 TABLET ORAL
Qty: 30 | Refills: 0
Start: 2023-10-20 | End: 2023-11-18

## 2023-10-20 RX ADMIN — Medication 50 MILLIGRAM(S): at 22:14

## 2023-10-20 RX ADMIN — Medication 25 MILLIGRAM(S): at 05:23

## 2023-10-20 RX ADMIN — Medication 1 TABLET(S): at 08:26

## 2023-10-20 RX ADMIN — SODIUM CHLORIDE 3 MILLILITER(S): 9 INJECTION INTRAMUSCULAR; INTRAVENOUS; SUBCUTANEOUS at 13:50

## 2023-10-20 RX ADMIN — Medication 25 MILLIGRAM(S): at 17:21

## 2023-10-20 RX ADMIN — Medication 650 MILLIGRAM(S): at 10:37

## 2023-10-20 RX ADMIN — SERTRALINE 50 MILLIGRAM(S): 25 TABLET, FILM COATED ORAL at 08:26

## 2023-10-20 RX ADMIN — Medication 50 MILLIGRAM(S): at 17:20

## 2023-10-20 RX ADMIN — Medication 1 TABLET(S): at 05:23

## 2023-10-20 RX ADMIN — Medication 1 TABLET(S): at 00:05

## 2023-10-20 RX ADMIN — Medication 10 MILLIGRAM(S): at 20:40

## 2023-10-20 RX ADMIN — Medication 650 MILLIGRAM(S): at 11:37

## 2023-10-20 RX ADMIN — Medication 100 MILLIGRAM(S): at 08:25

## 2023-10-20 RX ADMIN — PANTOPRAZOLE SODIUM 40 MILLIGRAM(S): 20 TABLET, DELAYED RELEASE ORAL at 05:23

## 2023-10-20 RX ADMIN — SODIUM CHLORIDE 3 MILLILITER(S): 9 INJECTION INTRAMUSCULAR; INTRAVENOUS; SUBCUTANEOUS at 22:14

## 2023-10-20 RX ADMIN — Medication 1 MILLIGRAM(S): at 08:26

## 2023-10-20 RX ADMIN — Medication 40 MILLIEQUIVALENT(S): at 17:21

## 2023-10-20 NOTE — DISCHARGE NOTE PROVIDER - PROVIDER TOKENS
PROVIDER:[TOKEN:[[id number]],FOLLOWUP:[1 week],ESTABLISHEDPATIENT:[T]] PROVIDER:[TOKEN:[[id number]],FOLLOWUP:[1 week],ESTABLISHEDPATIENT:[T]],FREE:[LAST:[Psychiatrist],PHONE:[(   )    -],FAX:[(   )    -],FOLLOWUP:[1 week],ESTABLISHEDPATIENT:[T]]

## 2023-10-20 NOTE — DISCHARGE NOTE PROVIDER - NSDCMRMEDTOKEN_GEN_ALL_CORE_FT
folic acid 1 mg oral tablet: 1 tab(s) orally once a day  metoprolol tartrate 25 mg oral tablet: 1 tab(s) orally 2 times a day  Multiple Vitamins oral tablet: 1 tab(s) orally once a day  Protonix 40 mg oral delayed release tablet: 1 tab(s) orally once a day  sertraline 50 mg oral tablet: 1 tab(s) orally once a day  thiamine 100 mg oral tablet: 1 tab(s) orally once a day

## 2023-10-20 NOTE — DISCHARGE NOTE PROVIDER - CARE PROVIDER_API CALL
Forrest Rausch  Internal Medicine  [address] [place] [id number]  Phone: [phone]  Fax: [phone]  Established Patient  Follow Up Time: 1 week   Forrest Rausch  Internal Medicine  [address] [place] [id number]  Phone: [phone]  Fax: [phone]  Established Patient  Follow Up Time: 1 week    Psychiatrist,   Phone: (   )    -  Fax: (   )    -  Established Patient  Follow Up Time: 1 week

## 2023-10-20 NOTE — DISCHARGE NOTE PROVIDER - NSDCCPCAREPLAN_GEN_ALL_CORE_FT
PRINCIPAL DISCHARGE DIAGNOSIS  Diagnosis: Alcohol dependence with withdrawal  Assessment and Plan of Treatment: s/p Librium Taper.  Counselled to stop alcohol.  Follow up with PMD in 1 week.

## 2023-10-20 NOTE — DISCHARGE NOTE PROVIDER - HOSPITAL COURSE
37 y/o male with hx of etoh use disorder, HTN, Anxiety who was brought in by EMS after his Wife called 911 concerned about him staying in a hotel for the past 3 days drinking excessively. Patient states he had a falling out with his Wife and has been under a lot of stress lately. He states he has been drinking of and off for years trying to cut back recently to only on weekends. States he usually drinks a bottle a wine when he does drink. He denies any hard liquor. Admits to having tremors in the past when he stops drinking. Denies any illicit drug use, has never been in any rehab for his drinking. He denies any hx of SI/HI, and currently is very remorseful and adamantly denies any desire to hurt himself. In the ED he was noted to be tachycardic, and tremulous. Given multiple doses of Valium and Librium with good effect. Initial etoh level 336, and labs c/w dehydration and alcoholic ketoacidosis.    Admitted with Alcohol Intoxication / Withdrawal. Started on CIWA Protocol and Librium Taper. During hospitalization, noted to have T wave inversions on monitor. No chest pain. Trop negative. ECHO with normal EF, no WMA and no significant valvulopathy. No signs of ACS.   He was monitored closely. He is feeling much better and is stable for discharge. Counselled extensively to stop alcohol.

## 2023-10-20 NOTE — DISCHARGE NOTE PROVIDER - ATTENDING DISCHARGE PHYSICAL EXAMINATION:
Vital Signs   T(C): 36.7 (20 Oct 2023 08:26), Max: 36.9 (20 Oct 2023 00:18)  T(F): 98.1 (20 Oct 2023 08:26), Max: 98.5 (20 Oct 2023 00:18)  HR: 80 (20 Oct 2023 08:26) (80 - 115)  BP: 122/82 (20 Oct 2023 08:26) (116/68 - 133/92)  RR: 18 (20 Oct 2023 08:26) (18 - 20)  SpO2: 96% (20 Oct 2023 08:26) (96% - 98%)  Parameters below as of 20 Oct 2023 08:26  Patient On (Oxygen Delivery Method): room air  General: Young male sitting in bed comfortably. No acute distress  HEENT: EOMI. Clear conjunctivae. Moist mucus membrane  Neck: Supple.   Chest: CTA bilaterally. No wheezing, rales or rhonchi. No chest wall tenderness.  Heart: Normal S1 & S2. RRR.   Abdomen: Non distended. Soft. Non-tender. + BS  Ext: No pedal edema. No calf tenderness   Neuro: AAO x 3. No focal deficit. No speech disorder. No tremors in hands.   Skin: Warm and Dry  Psychiatry: Normal mood and affect      Vital Signs   T(C): 36.9 (22 Oct 2023 08:43), Max: 36.9 (22 Oct 2023 08:43)  T(F): 98.4 (22 Oct 2023 08:43), Max: 98.4 (22 Oct 2023 08:43)  HR: 77 (22 Oct 2023 08:43) (60 - 90)  BP: 128/88 (22 Oct 2023 08:43) (113/74 - 150/99)  RR: 17 (22 Oct 2023 08:43) (16 - 18)  SpO2: 99% (22 Oct 2023 08:43) (95% - 99%)  Parameters below as of 22 Oct 2023 08:43  Patient On (Oxygen Delivery Method): room air  General: Young male sitting in bed comfortably. No acute distress  HEENT: EOMI. Clear conjunctivae. Moist mucus membrane  Neck: Supple.   Chest: CTA bilaterally. No wheezing, rales or rhonchi. No chest wall tenderness.  Heart: Normal S1 & S2. RRR.   Abdomen: Non distended. Soft. Non-tender. + BS  Ext: No pedal edema. No calf tenderness   Neuro: AAO x 3. No focal deficit. No speech disorder. No tremors in hands.   Skin: Warm and Dry  Psychiatry: Normal mood and affect

## 2023-10-21 PROCEDURE — 99232 SBSQ HOSP IP/OBS MODERATE 35: CPT

## 2023-10-21 RX ORDER — ALPRAZOLAM 0.25 MG
0.5 TABLET ORAL EVERY 8 HOURS
Refills: 0 | Status: DISCONTINUED | OUTPATIENT
Start: 2023-10-21 | End: 2023-10-22

## 2023-10-21 RX ADMIN — SODIUM CHLORIDE 3 MILLILITER(S): 9 INJECTION INTRAMUSCULAR; INTRAVENOUS; SUBCUTANEOUS at 13:22

## 2023-10-21 RX ADMIN — Medication 650 MILLIGRAM(S): at 00:06

## 2023-10-21 RX ADMIN — Medication 650 MILLIGRAM(S): at 19:37

## 2023-10-21 RX ADMIN — Medication 100 MILLIGRAM(S): at 08:59

## 2023-10-21 RX ADMIN — Medication 2 MILLIGRAM(S): at 11:30

## 2023-10-21 RX ADMIN — Medication 50 MILLIGRAM(S): at 06:25

## 2023-10-21 RX ADMIN — PANTOPRAZOLE SODIUM 40 MILLIGRAM(S): 20 TABLET, DELAYED RELEASE ORAL at 06:25

## 2023-10-21 RX ADMIN — Medication 25 MILLIGRAM(S): at 17:01

## 2023-10-21 RX ADMIN — Medication 1 TABLET(S): at 08:59

## 2023-10-21 RX ADMIN — Medication 25 MILLIGRAM(S): at 06:25

## 2023-10-21 RX ADMIN — Medication 10 MILLIGRAM(S): at 04:05

## 2023-10-21 RX ADMIN — Medication 650 MILLIGRAM(S): at 01:06

## 2023-10-21 RX ADMIN — Medication 1 MILLIGRAM(S): at 08:59

## 2023-10-21 RX ADMIN — SODIUM CHLORIDE 3 MILLILITER(S): 9 INJECTION INTRAMUSCULAR; INTRAVENOUS; SUBCUTANEOUS at 21:33

## 2023-10-21 RX ADMIN — SODIUM CHLORIDE 3 MILLILITER(S): 9 INJECTION INTRAMUSCULAR; INTRAVENOUS; SUBCUTANEOUS at 06:24

## 2023-10-21 RX ADMIN — SERTRALINE 50 MILLIGRAM(S): 25 TABLET, FILM COATED ORAL at 08:59

## 2023-10-21 RX ADMIN — Medication 0.5 MILLIGRAM(S): at 17:46

## 2023-10-21 RX ADMIN — Medication 650 MILLIGRAM(S): at 20:37

## 2023-10-21 NOTE — PROGRESS NOTE ADULT - ASSESSMENT
39 y/o male with hx of etoh use disorder, HTN, Anxiety who was brought in by EMS after his Wife called 911 concerned about him staying in a hotel for the past 3 days drinking excessively. Patient states he had a falling out with his Wife and has been under a lot of stress lately. He states he has been drinking of and off for years trying to cut back recently to only on weekends. States he usually drinks a bottle a wine when he does drink. He denies any hard liquor. Admits to having tremors in the past when he stops drinking. Denies any illicit drug use, has never been in any rehab for his drinking. He denies any hx of SI/HI, and currently is very remorseful and adamantly denies any desire to hurt himself. In the ED he was noted to be tachycardic, and tremulous. Given multiple doses of Valium and Librium with good effect. Initial etoh level 336, and labs c/w dehydration and alcoholic ketoacidosis.      Etoh intoxication/withdrawal:  -CIWA protocol with librium taper and PRN ativan for CIWA >8  -FA, MVI, Thiamine for possible thiamine deficiency   -Fall/seizure protocol  - eval     etoh ketoacidosis:  -s/p IV  -encourage for POO intake  -resolved     HTN:  -DASH diet  -Cont. Metoprolol     Anxiety:  -Cont. Sertraline   -Xanax PRN  -Denies SI/HI    GERD:  -PPI    Hyponatremia:   -Resolved     T inversion on EKG:   -Patient denies any symptoms, troponin negative  -TTE with normal EF. No WMA. No significant valvulopathy.     Hypophosphatemia:  -Repleted    DVT Prophylaxis -- Venodyne    Dispo: Home likely in 24 hours after finishing Librium Taper.   
39 y/o male with etoh intoxication/withdrawal, etoh ketoacidosis, HTN, Anxiety, GERD    Plan:     Etoh intoxication/withdrawal:  -Admitted to tele  -CIWA protocol with librium taper and PRN valium for CIWA >8  -FA, MVI, Thiamine for possible thiamine deficiency   -Fall/seizure protocol  -Low threshold for MICU eval with signs of withdrawal in setting of elevated etoh level   -PPI  -Check daily lytes  - for [facility] referral   -VC boots while in bed    etoh ketoacidosis:  -Cont. IV/PO intake  -will monitor electrolytes    HTN:  -DASH diet  -Cont. Metoprolol     Anxiety:  -Cont. Sertraline   -Denies SI/HI    GERD:  -PPI    Hyponatremia: In setting of alcohol intoxication, getting better, will continue to monitor BMP      T inversion on EKG: Patient denies any symptoms, troponin negative, will get TTE.    Discussed with ED staff, Patient 
39 y/o male with hx of etoh use disorder, HTN, Anxiety who was brought in by EMS after his Wife called 911 concerned about him staying in a hotel for the past 3 days drinking excessively. Patient states he had a falling out with his Wife and has been under a lot of stress lately. He states he has been drinking of and off for years trying to cut back recently to only on weekends. States he usually drinks a bottle a wine when he does drink. He denies any hard liquor. Admits to having tremors in the past when he stops drinking. Denies any illicit drug use, has never been in any rehab for his drinking. He denies any hx of SI/HI, and currently is very remorseful and adamantly denies any desire to hurt himself. In the ED he was noted to be tachycardic, and tremulous. Given multiple doses of Valium and Librium with good effect. Initial etoh level 336, and labs c/w dehydration and alcoholic ketoacidosis.      Etoh intoxication/withdrawal:  -CIWA protocol with librium taper and PRN valium for CIWA >8  -FA, MVI, Thiamine for possible thiamine deficiency   -Fall/seizure protocol  - eval     etoh ketoacidosis:  -s/p IV  -encourage for POO intake  -resolved     HTN:  -DASH diet  -Cont. Metoprolol     Anxiety:  -Cont. Sertraline   -Denies SI/HI    GERD:  -PPI    Hyponatremia:   -Resolved     T inversion on EKG:   -Patient denies any symptoms, troponin negative  -TTE with normal EF. No WMA. No significant valvulopathy.     Hypophosphatemia:  -Repleted    DVT Prophylaxis -- Venodyne    Dispo: Home likely tomorrow.   
37 y/o male with hx of etoh use disorder, HTN, Anxiety who was brought in by EMS after his Wife called 911 concerned about him staying in a hotel for the past 3 days drinking excessively. Patient states he had a falling out with his Wife and has been under a lot of stress lately. He states he has been drinking of and off for years trying to cut back recently to only on weekends. States he usually drinks a bottle a wine when he does drink. He denies any hard liquor. Admits to having tremors in the past when he stops drinking. Denies any illicit drug use, has never been in any rehab for his drinking. He denies any hx of SI/HI, and currently is very remorseful and adamantly denies any desire to hurt himself. In the ED he was noted to be tachycardic, and tremulous. Given multiple doses of Valium and Librium with good effect. Initial etoh level 336, and labs c/w dehydration and alcoholic ketoacidosis.      Etoh intoxication/withdrawal:  -CIWA protocol with librium taper and PRN valium for CIWA >8  -FA, MVI, Thiamine for possible thiamine deficiency   -Fall/seizure protocol  -[facility]     etoh ketoacidosis:  -Cont. IV/PO intake  -will monitor electrolytes    HTN:  -DASH diet  -Cont. Metoprolol     Anxiety:  -Cont. Sertraline   -Denies SI/HI    GERD:  -PPI    Hyponatremia:   -Resolved     T inversion on EKG:   -Patient denies any symptoms, troponin negative  -TTE pending     Hypophosphatemia:  -Replete    DVT Prophylaxis -- Venodyne    Dispo: Home once stable.

## 2023-10-21 NOTE — PROGRESS NOTE ADULT - REASON FOR ADMISSION
Etoh intoxication/Withdrawal

## 2023-10-21 NOTE — PROGRESS NOTE ADULT - SUBJECTIVE AND OBJECTIVE BOX
Alcohol dependence with withdrawal    HPI:  37 y/o male with hx of etoh use disorder, HTN, Anxiety who was brought in by EMS after his Wife called 911 concerned about him staying in a hotel for the past 3 days drinking excessively. Patient states he had a falling out with his Wife and has been under a lot of stress lately. He states he has been drinking of and off for years trying to cut back recently to only on weekends. States he usually drinks a bottle a wine when he does drink. He denies any hard liquor. Admits to having tremors in the past when he stops drinking. Denies any illicit drug use, has never been in any rehab for his drinking. He denies any hx of SI/HI, and currently is very remorseful and adamantly denies any desire to hurt himself. In the ED he was noted to be tachycardic, and tremulous. Given multiple doses of Valium and Librium with good effect. Initial etoh level 336, and labs c/w dehydration and alcoholic ketoacidosis.   (18 Oct 2023 04:06)    Interval History:  Patient was seen and examined at bedside around 11:15 am.  Feels anxious. Has tremors in hands.   Denies chest pain, palpitations, shortness of breath, headache, dizziness, visual symptoms, hallucinations, nausea, vomiting or abdominal pain.    ROS:  As per interval history otherwise unremarkable.    PHYSICAL EXAM:  Vital Signs   T(C): 36.3 (19 Oct 2023 12:00), Max: 37.1 (18 Oct 2023 19:49)  T(F): 97.4 (19 Oct 2023 12:00), Max: 98.8 (18 Oct 2023 19:49)  HR: 93 (19 Oct 2023 12:00) (60 - 160)  BP: 114/66 (19 Oct 2023 12:00) (114/66 - 153/68)  RR: 20 (19 Oct 2023 12:00) (18 - 20)  SpO2: 98% (19 Oct 2023 12:00) (94% - 99%)  Parameters below as of 19 Oct 2023 12:00  Patient On (Oxygen Delivery Method): room air  General: Young male sitting in bed comfortably. No acute distress  HEENT: EOMI. Clear conjunctivae. Moist mucus membrane  Neck: Supple.   Chest: CTA bilaterally. No wheezing, rales or rhonchi. No chest wall tenderness.  Heart: Normal S1 & S2. RRR.   Abdomen: Non distended. Soft. Non-tender. + BS  Ext: No pedal edema. No calf tenderness   Neuro: AAO x 3. No focal deficit. No speech disorder. Tremors in hands.   Skin: Warm and Dry  Psychiatry: Anxious     LABS:                        12.7   4.41  )-----------( 146      ( 19 Oct 2023 03:33 )             36.7     10-19    137  |  97  |  9.5  ----------------------------<  115<H>  3.7   |  24.0  |  0.90    Ca    8.7      19 Oct 2023 03:33  Phos  1.7     10-19  Mg     2.0     10-19    TPro  7.2  /  Alb  4.1  /  TBili  1.3  /  DBili  x   /  AST  144<H>  /  ALT  62<H>  /  AlkPhos  137<H>  10-19      Urinalysis Basic - ( 19 Oct 2023 03:33 )    Color: x / Appearance: x / SG: x / pH: x  Gluc: 115 mg/dL / Ketone: x  / Bili: x / Urobili: x   Blood: x / Protein: x / Nitrite: x   Leuk Esterase: x / RBC: x / WBC x   Sq Epi: x / Non Sq Epi: x / Bacteria: x    RADIOLOGY & ADDITIONAL STUDIES:  Reviewed     MEDICATIONS  (STANDING):  chlordiazePOXIDE 50 milliGRAM(s) Oral every 8 hours  chlordiazePOXIDE   Oral   folic acid 1 milliGRAM(s) Oral daily  influenza   Vaccine 0.5 milliLiter(s) IntraMuscular once  metoprolol tartrate 25 milliGRAM(s) Oral two times a day  multivitamin 1 Tablet(s) Oral daily  pantoprazole    Tablet 40 milliGRAM(s) Oral before breakfast  potassium phosphate / sodium phosphate Tablet (K-PHOS No. 2) 1 Tablet(s) Oral four times a day  QUEtiapine 50 milliGRAM(s) Oral two times a day  sodium chloride 0.45%. 1000 milliLiter(s) (75 mL/Hr) IV Continuous <Continuous>  sodium chloride 0.9% lock flush 3 milliLiter(s) IV Push every 8 hours  thiamine 100 milliGRAM(s) Oral daily    MEDICATIONS  (PRN):  acetaminophen     Tablet .. 650 milliGRAM(s) Oral every 6 hours PRN Temp greater or equal to 38C (100.4F), Mild Pain (1 - 3)  aluminum hydroxide/magnesium hydroxide/simethicone Suspension 30 milliLiter(s) Oral every 4 hours PRN Dyspepsia  diazepam  Injectable 10 milliGRAM(s) IV Push every 4 hours PRN Symptom-triggered when CIWA-Ar score 8 or Greater  LORazepam   Injectable 2 milliGRAM(s) IV Push every 1 hour PRN CIWA-Ar score 8 or greater  melatonin 3 milliGRAM(s) Oral at bedtime PRN Insomnia  ondansetron Injectable 4 milliGRAM(s) IV Push every 8 hours PRN Nausea and/or Vomiting      
Alcohol dependence with withdrawal    HPI:  37 y/o male with hx of etoh use disorder, HTN, Anxiety who was brought in by EMS after his Wife called 911 concerned about him staying in a hotel for the past 3 days drinking excessively. Patient states he had a falling out with his Wife and has been under a lot of stress lately. He states he has been drinking of and off for years trying to cut back recently to only on weekends. States he usually drinks a bottle a wine when he does drink. He denies any hard liquor. Admits to having tremors in the past when he stops drinking. Denies any illicit drug use, has never been in any rehab for his drinking. He denies any hx of SI/HI, and currently is very remorseful and adamantly denies any desire to hurt himself. In the ED he was noted to be tachycardic, and tremulous. Given multiple doses of Valium and Librium with good effect. Initial etoh level 336, and labs c/w dehydration and alcoholic ketoacidosis.   (18 Oct 2023 04:06)    Interval History:  Patient was seen and examined at bedside around 9:45 am.  Feels better today.  Less anxious.   Tremors in hands improving.   Denies chest pain, palpitations, shortness of breath, headache, dizziness, visual symptoms, hallucinations, nausea, vomiting or abdominal pain.    ROS:  As per interval history otherwise unremarkable.    PHYSICAL EXAM:  Vital Signs   T(C): 36.7 (20 Oct 2023 08:26), Max: 36.9 (20 Oct 2023 00:18)  T(F): 98.1 (20 Oct 2023 08:26), Max: 98.5 (20 Oct 2023 00:18)  HR: 80 (20 Oct 2023 08:26) (80 - 115)  BP: 122/82 (20 Oct 2023 08:26) (116/68 - 133/92)  RR: 18 (20 Oct 2023 08:26) (18 - 20)  SpO2: 96% (20 Oct 2023 08:26) (96% - 98%)  Parameters below as of 20 Oct 2023 08:26  Patient On (Oxygen Delivery Method): room air  General: Young male sitting in bed comfortably. No acute distress  HEENT: EOMI. Clear conjunctivae. Moist mucus membrane  Neck: Supple.   Chest: CTA bilaterally. No wheezing, rales or rhonchi. No chest wall tenderness.  Heart: Normal S1 & S2. RRR.   Abdomen: Non distended. Soft. Non-tender. + BS  Ext: No pedal edema. No calf tenderness   Neuro: AAO x 3. No focal deficit. No speech disorder. No tremors in hands.   Skin: Warm and Dry  Psychiatry: Normal mood and affect     LABS:                        12.3   4.02  )-----------( 114      ( 20 Oct 2023 07:58 )             36.7     10-20    142  |  105  |  7.5<L>  ----------------------------<  98  3.6   |  25.0  |  0.86    Ca    8.7      20 Oct 2023 07:58  Phos  4.8     10-20  Mg     2.0     10-20    TPro  6.9  /  Alb  3.7  /  TBili  0.7  /  DBili  x   /  AST  82<H>  /  ALT  53<H>  /  AlkPhos  116  10-20    PT/INR - ( 20 Oct 2023 07:58 )   PT: 10.3 sec;   INR: 0.93 ratio      RADIOLOGY & ADDITIONAL STUDIES:  Reviewed     MEDICATIONS  (STANDING):  chlordiazePOXIDE 50 milliGRAM(s) Oral every 12 hours  chlordiazePOXIDE   Oral   folic acid 1 milliGRAM(s) Oral daily  influenza   Vaccine 0.5 milliLiter(s) IntraMuscular once  metoprolol tartrate 25 milliGRAM(s) Oral two times a day  multivitamin 1 Tablet(s) Oral daily  pantoprazole    Tablet 40 milliGRAM(s) Oral before breakfast  sertraline 50 milliGRAM(s) Oral daily  sodium chloride 0.9% lock flush 3 milliLiter(s) IV Push every 8 hours  traZODone 50 milliGRAM(s) Oral at bedtime    MEDICATIONS  (PRN):  acetaminophen     Tablet .. 650 milliGRAM(s) Oral every 6 hours PRN Temp greater or equal to 38C (100.4F), Mild Pain (1 - 3)  aluminum hydroxide/magnesium hydroxide/simethicone Suspension 30 milliLiter(s) Oral every 4 hours PRN Dyspepsia  diazepam  Injectable 10 milliGRAM(s) IV Push every 4 hours PRN Symptom-triggered when CIWA-Ar score 8 or Greater  LORazepam   Injectable 2 milliGRAM(s) IV Push every 1 hour PRN CIWA-Ar score 8 or greater  melatonin 3 milliGRAM(s) Oral at bedtime PRN Insomnia  ondansetron Injectable 4 milliGRAM(s) IV Push every 8 hours PRN Nausea and/or Vomiting        
Alcohol dependence with withdrawal    HPI:  37 y/o male with hx of etoh use disorder, HTN, Anxiety who was brought in by EMS after his Wife called 911 concerned about him staying in a hotel for the past 3 days drinking excessively. Patient states he had a falling out with his Wife and has been under a lot of stress lately. He states he has been drinking of and off for years trying to cut back recently to only on weekends. States he usually drinks a bottle a wine when he does drink. He denies any hard liquor. Admits to having tremors in the past when he stops drinking. Denies any illicit drug use, has never been in any rehab for his drinking. He denies any hx of SI/HI, and currently is very remorseful and adamantly denies any desire to hurt himself. In the ED he was noted to be tachycardic, and tremulous. Given multiple doses of Valium and Librium with good effect. Initial etoh level 336, and labs c/w dehydration and alcoholic ketoacidosis.   (18 Oct 2023 04:06)    Interval History:  Patient was seen and examined at bedside around 10 am.  Episodes of panic attach last night after having argument with wife.   Feels better today.  No active complaints.   Denies chest pain, palpitations, shortness of breath, headache, dizziness, visual symptoms, hallucinations, tremors, nausea, vomiting or abdominal pain.    ROS:  As per interval history otherwise unremarkable.    PHYSICAL EXAM:  Vital Signs   T(C): 36.6 (21 Oct 2023 08:02), Max: 36.8 (20 Oct 2023 20:37)  T(F): 97.9 (21 Oct 2023 08:02), Max: 98.2 (20 Oct 2023 20:37)  HR: 82 (21 Oct 2023 11:30) (68 - 83)  BP: 150/99 (21 Oct 2023 11:30) (107/69 - 150/99)  BP(mean): 87 (20 Oct 2023 16:34) (87 - 87)  RR: 16 (21 Oct 2023 11:30) (16 - 19)  SpO2: 95% (21 Oct 2023 11:30) (95% - 96%)  Parameters below as of 21 Oct 2023 11:30  Patient On (Oxygen Delivery Method): room air  General: Young male sitting in bed comfortably. No acute distress  HEENT: EOMI. Clear conjunctivae. Moist mucus membrane  Neck: Supple.   Chest: CTA bilaterally. No wheezing, rales or rhonchi. No chest wall tenderness.  Heart: Normal S1 & S2. RRR.   Abdomen: Non distended. Soft. Non-tender. + BS  Ext: No pedal edema. No calf tenderness   Neuro: AAO x 3. No focal deficit. No speech disorder. No tremors in hands.   Skin: Warm and Dry  Psychiatry: Normal mood and affect     LABS:                        12.3   4.02  )-----------( 114      ( 20 Oct 2023 07:58 )             36.7     10-20    142  |  105  |  7.5<L>  ----------------------------<  98  3.6   |  25.0  |  0.86    Ca    8.7      20 Oct 2023 07:58  Phos  4.8     10-20  Mg     2.0     10-20    TPro  6.9  /  Alb  3.7  /  TBili  0.7  /  DBili  x   /  AST  82<H>  /  ALT  53<H>  /  AlkPhos  116  10-20    PT/INR - ( 20 Oct 2023 07:58 )   PT: 10.3 sec;   INR: 0.93 ratio      RADIOLOGY & ADDITIONAL STUDIES:  Reviewed     MEDICATIONS  (STANDING):  chlordiazePOXIDE   Oral   folic acid 1 milliGRAM(s) Oral daily  influenza   Vaccine 0.5 milliLiter(s) IntraMuscular once  metoprolol tartrate 25 milliGRAM(s) Oral two times a day  multivitamin 1 Tablet(s) Oral daily  pantoprazole    Tablet 40 milliGRAM(s) Oral before breakfast  sertraline 50 milliGRAM(s) Oral daily  sodium chloride 0.9% lock flush 3 milliLiter(s) IV Push every 8 hours  thiamine 100 milliGRAM(s) Oral daily  traZODone 50 milliGRAM(s) Oral at bedtime    MEDICATIONS  (PRN):  acetaminophen     Tablet .. 650 milliGRAM(s) Oral every 6 hours PRN Temp greater or equal to 38C (100.4F), Mild Pain (1 - 3)  ALPRAZolam 0.5 milliGRAM(s) Oral every 8 hours PRN Anxiety  aluminum hydroxide/magnesium hydroxide/simethicone Suspension 30 milliLiter(s) Oral every 4 hours PRN Dyspepsia  LORazepam   Injectable 2 milliGRAM(s) IV Push every 1 hour PRN CIWA-Ar score 8 or greater  melatonin 3 milliGRAM(s) Oral at bedtime PRN Insomnia  ondansetron Injectable 4 milliGRAM(s) IV Push every 8 hours PRN Nausea and/or Vomiting          
YOBANY ALEX    [id number]    38y      Male    Patient is a 38y old  Male who presents with a chief complaint of Etoh intoxication/Withdrawal (18 Oct 2023 04:06)      INTERVAL HPI/OVERNIGHT EVENTS:    patient is not having much withdrawal symptoms, denies chest pain or sob, dizziness, fever, chills.     Vital Signs Last 24 Hrs  T(C): 36.5 (18 Oct 2023 11:21), Max: 36.8 (17 Oct 2023 18:05)  T(F): 97.7 (18 Oct 2023 11:21), Max: 98.2 (17 Oct 2023 18:05)  HR: 95 (18 Oct 2023 11:21) (87 - 129)  BP: 134/90 (18 Oct 2023 11:21) (123/81 - 153/91)  BP(mean): 98 (18 Oct 2023 04:06) (98 - 98)  RR: 20 (18 Oct 2023 11:21) (16 - 20)  SpO2: 98% (18 Oct 2023 11:21) (94% - 98%)    Parameters below as of 18 Oct 2023 11:21  Patient On (Oxygen Delivery Method): room air        PHYSICAL EXAM:    GENERAL: Young male looking comfortable   HEENT: PERRL, +EOMI  NECK: soft, Supple, No JVD  CHEST/LUNG: Clear to auscultate bilaterally; No wheezing  HEART: S1S2+, Regular rate and rhythm; No murmurs  ABDOMEN: Soft, Nontender, Nondistended; Bowel sounds present  EXTREMITIES:  1+ Peripheral Pulses, No edema  SKIN: No rashes or lesions  NEURO: AAOX3  PSYCH: normal mood      LABS:                        13.6   5.91  )-----------( 214      ( 17 Oct 2023 19:50 )             37.9     10-18    133<L>  |  91<L>  |  8.8  ----------------------------<  86  3.5   |  24.0  |  0.83    Ca    8.0<L>      18 Oct 2023 06:44  Phos  3.0     10-18  Mg     1.9     10-18    TPro  6.9  /  Alb  4.1  /  TBili  1.2  /  DBili  x   /  AST  119<H>  /  ALT  52<H>  /  AlkPhos  106  10-18        I&O's Summary      MEDICATIONS  (STANDING):  chlordiazePOXIDE   Oral   chlordiazePOXIDE 50 milliGRAM(s) Oral every 6 hours  folic acid 1 milliGRAM(s) Oral daily  influenza   Vaccine 0.5 milliLiter(s) IntraMuscular once  metoprolol tartrate 25 milliGRAM(s) Oral two times a day  multivitamin 1 Tablet(s) Oral daily  pantoprazole    Tablet 40 milliGRAM(s) Oral before breakfast  potassium chloride    Tablet ER 40 milliEquivalent(s) Oral every 4 hours  QUEtiapine 50 milliGRAM(s) Oral daily  sodium chloride 0.45%. 1000 milliLiter(s) (125 mL/Hr) IV Continuous <Continuous>  sodium chloride 0.9% lock flush 3 milliLiter(s) IV Push every 8 hours  thiamine 100 milliGRAM(s) Oral daily    MEDICATIONS  (PRN):  acetaminophen     Tablet .. 650 milliGRAM(s) Oral every 6 hours PRN Temp greater or equal to 38C (100.4F), Mild Pain (1 - 3)  aluminum hydroxide/magnesium hydroxide/simethicone Suspension 30 milliLiter(s) Oral every 4 hours PRN Dyspepsia  diazepam  Injectable 10 milliGRAM(s) IV Push every 4 hours PRN Symptom-triggered when CIWA-Ar score 8 or Greater  melatonin 3 milliGRAM(s) Oral at bedtime PRN Insomnia  ondansetron Injectable 4 milliGRAM(s) IV Push every 8 hours PRN Nausea and/or Vomiting

## 2023-10-22 VITALS
OXYGEN SATURATION: 100 % | RESPIRATION RATE: 18 BRPM | DIASTOLIC BLOOD PRESSURE: 84 MMHG | SYSTOLIC BLOOD PRESSURE: 126 MMHG | TEMPERATURE: 98 F | HEART RATE: 80 BPM

## 2023-10-22 PROCEDURE — 83735 ASSAY OF MAGNESIUM: CPT

## 2023-10-22 PROCEDURE — 84484 ASSAY OF TROPONIN QUANT: CPT

## 2023-10-22 PROCEDURE — 85610 PROTHROMBIN TIME: CPT

## 2023-10-22 PROCEDURE — 96374 THER/PROPH/DIAG INJ IV PUSH: CPT

## 2023-10-22 PROCEDURE — 85025 COMPLETE CBC W/AUTO DIFF WBC: CPT

## 2023-10-22 PROCEDURE — 96376 TX/PRO/DX INJ SAME DRUG ADON: CPT

## 2023-10-22 PROCEDURE — 84100 ASSAY OF PHOSPHORUS: CPT

## 2023-10-22 PROCEDURE — 80307 DRUG TEST PRSMV CHEM ANLYZR: CPT

## 2023-10-22 PROCEDURE — 93306 TTE W/DOPPLER COMPLETE: CPT

## 2023-10-22 PROCEDURE — 36415 COLL VENOUS BLD VENIPUNCTURE: CPT

## 2023-10-22 PROCEDURE — 85027 COMPLETE CBC AUTOMATED: CPT

## 2023-10-22 PROCEDURE — 93005 ELECTROCARDIOGRAM TRACING: CPT

## 2023-10-22 PROCEDURE — 80053 COMPREHEN METABOLIC PANEL: CPT

## 2023-10-22 PROCEDURE — 99285 EMERGENCY DEPT VISIT HI MDM: CPT | Mod: 25

## 2023-10-22 PROCEDURE — 81001 URINALYSIS AUTO W/SCOPE: CPT

## 2023-10-22 PROCEDURE — 99239 HOSP IP/OBS DSCHRG MGMT >30: CPT

## 2023-10-22 RX ORDER — KETOROLAC TROMETHAMINE 30 MG/ML
30 SYRINGE (ML) INJECTION ONCE
Refills: 0 | Status: DISCONTINUED | OUTPATIENT
Start: 2023-10-22 | End: 2023-10-22

## 2023-10-22 RX ADMIN — PANTOPRAZOLE SODIUM 40 MILLIGRAM(S): 20 TABLET, DELAYED RELEASE ORAL at 05:11

## 2023-10-22 RX ADMIN — SERTRALINE 50 MILLIGRAM(S): 25 TABLET, FILM COATED ORAL at 12:15

## 2023-10-22 RX ADMIN — Medication 100 MILLIGRAM(S): at 12:14

## 2023-10-22 RX ADMIN — SODIUM CHLORIDE 3 MILLILITER(S): 9 INJECTION INTRAMUSCULAR; INTRAVENOUS; SUBCUTANEOUS at 12:16

## 2023-10-22 RX ADMIN — Medication 1 MILLIGRAM(S): at 12:14

## 2023-10-22 RX ADMIN — Medication 50 MILLIGRAM(S): at 00:03

## 2023-10-22 RX ADMIN — Medication 50 MILLIGRAM(S): at 05:10

## 2023-10-22 RX ADMIN — Medication 30 MILLIGRAM(S): at 02:45

## 2023-10-22 RX ADMIN — Medication 1 TABLET(S): at 12:14

## 2023-10-22 RX ADMIN — Medication 25 MILLIGRAM(S): at 05:11

## 2023-10-22 RX ADMIN — SODIUM CHLORIDE 3 MILLILITER(S): 9 INJECTION INTRAMUSCULAR; INTRAVENOUS; SUBCUTANEOUS at 05:11

## 2023-10-22 RX ADMIN — Medication 30 MILLIGRAM(S): at 03:45

## 2023-10-22 NOTE — DISCHARGE NOTE NURSING/CASE MANAGEMENT/SOCIAL WORK - NSDCPEFALRISK_GEN_ALL_CORE
For information on Fall & Injury Prevention, visit: [email or web address] OR  [email or web address] OR  https://www.cdc.gov/steadi/patient.html

## 2023-10-22 NOTE — DISCHARGE NOTE NURSING/CASE MANAGEMENT/SOCIAL WORK - PATIENT PORTAL LINK FT
You can access the FollowMyHealth Patient Portal offered by [facility] by registering at the following website: [email or web address]. By joining [facility]’s FollowMyHealth portal, you will also be able to view your health information using other applications (apps) compatible with our system.

## 2024-02-26 NOTE — PATIENT PROFILE ADULT - FUNCTIONAL SCREEN CURRENT LEVEL: SWALLOWING (IF SCORE 2 OR MORE FOR ANY ITEM, CONSULT REHAB SERVICES), MLM)
Post-Op Assessment Note            No anethesia notable event occurred.                BP      Temp      Pulse     Resp      SpO2        
Post-Op Assessment Note          Post-op block assessment: site cleaned, catheter intact and no complications                  BP      Temp      Pulse     Resp      SpO2      Catheter removed. Tip remains intact. Site cleaned and dried.   
0 = swallows foods/liquids without difficulty

## 2024-04-24 ENCOUNTER — INPATIENT (INPATIENT)
Facility: HOSPITAL | Age: 39
LOS: 3 days | Discharge: HOME | DRG: 914 | End: 2024-04-28
Attending: INTERNAL MEDICINE | Admitting: INTERNAL MEDICINE
Payer: COMMERCIAL

## 2024-04-24 VITALS
OXYGEN SATURATION: 96 % | SYSTOLIC BLOOD PRESSURE: 138 MMHG | RESPIRATION RATE: 17 BRPM | DIASTOLIC BLOOD PRESSURE: 94 MMHG | WEIGHT: 179.9 LBS | TEMPERATURE: 98 F | HEART RATE: 115 BPM
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
## 2024-04-24 LAB
ALBUMIN SERPL ELPH-MCNC: 4.3 G/DL — SIGNIFICANT CHANGE UP (ref 3.3–5.2)
ALP SERPL-CCNC: 91 U/L — SIGNIFICANT CHANGE UP (ref 40–120)
ALT FLD-CCNC: 60 U/L — HIGH
ANION GAP SERPL CALC-SCNC: 23 MMOL/L — HIGH (ref 5–17)
ANISOCYTOSIS BLD QL: SLIGHT — SIGNIFICANT CHANGE UP
APAP SERPL-MCNC: <3 UG/ML — LOW (ref 10–26)
APTT BLD: 28.4 SEC — SIGNIFICANT CHANGE UP (ref 24.5–35.6)
AST SERPL-CCNC: 120 U/L — HIGH
BASE EXCESS BLDV CALC-SCNC: -4 MMOL/L — LOW (ref -2–3)
BASOPHILS # BLD AUTO: 0 K/UL — SIGNIFICANT CHANGE UP (ref 0–0.2)
BASOPHILS NFR BLD AUTO: 0 % — SIGNIFICANT CHANGE UP (ref 0–2)
BILIRUB SERPL-MCNC: 1.2 MG/DL — SIGNIFICANT CHANGE UP (ref 0.4–2)
BUN SERPL-MCNC: 11.1 MG/DL — SIGNIFICANT CHANGE UP (ref 8–20)
CA-I SERPL-SCNC: 0.9 MMOL/L — LOW (ref 1.15–1.33)
CALCIUM SERPL-MCNC: 7.8 MG/DL — LOW (ref 8.4–10.5)
CHLORIDE BLDV-SCNC: 78 MMOL/L — LOW (ref 96–108)
CHLORIDE SERPL-SCNC: 74 MMOL/L — LOW (ref 96–108)
CK MB CFR SERPL CALC: 5.5 NG/ML — SIGNIFICANT CHANGE UP (ref 0–6.7)
CK SERPL-CCNC: 524 U/L — HIGH (ref 30–200)
CO2 SERPL-SCNC: 16 MMOL/L — LOW (ref 22–29)
CREAT SERPL-MCNC: 0.5 MG/DL — SIGNIFICANT CHANGE UP (ref 0.5–1.3)
EGFR: 134 ML/MIN/1.73M2 — SIGNIFICANT CHANGE UP
EOSINOPHIL # BLD AUTO: 0 K/UL — SIGNIFICANT CHANGE UP (ref 0–0.5)
EOSINOPHIL NFR BLD AUTO: 0 % — SIGNIFICANT CHANGE UP (ref 0–6)
ETHANOL SERPL-MCNC: 342 MG/DL — HIGH (ref 0–9)
FLUAV AG NPH QL: SIGNIFICANT CHANGE UP
FLUBV AG NPH QL: SIGNIFICANT CHANGE UP
GAS PNL BLDV: 113 MMOL/L — CRITICAL LOW (ref 136–145)
GAS PNL BLDV: SIGNIFICANT CHANGE UP
GLUCOSE BLDV-MCNC: 138 MG/DL — HIGH (ref 70–99)
GLUCOSE SERPL-MCNC: 130 MG/DL — HIGH (ref 70–99)
HCO3 BLDV-SCNC: 20 MMOL/L — LOW (ref 22–29)
HCT VFR BLD CALC: 34.1 % — LOW (ref 39–50)
HCT VFR BLDA CALC: 40 % — SIGNIFICANT CHANGE UP
HGB BLD CALC-MCNC: 13.4 G/DL — SIGNIFICANT CHANGE UP (ref 12.6–17.4)
HGB BLD-MCNC: 12.8 G/DL — LOW (ref 13–17)
INR BLD: 1.17 RATIO — SIGNIFICANT CHANGE UP (ref 0.85–1.18)
LACTATE BLDV-MCNC: 7.1 MMOL/L — CRITICAL HIGH (ref 0.5–2)
LIDOCAIN IGE QN: 84 U/L — HIGH (ref 22–51)
LYMPHOCYTES # BLD AUTO: 0.42 K/UL — LOW (ref 1–3.3)
LYMPHOCYTES # BLD AUTO: 5.4 % — LOW (ref 13–44)
MANUAL SMEAR VERIFICATION: SIGNIFICANT CHANGE UP
MCHC RBC-ENTMCNC: 30.2 PG — SIGNIFICANT CHANGE UP (ref 27–34)
MCHC RBC-ENTMCNC: 37.5 GM/DL — HIGH (ref 32–36)
MCV RBC AUTO: 80.4 FL — SIGNIFICANT CHANGE UP (ref 80–100)
METAMYELOCYTES # FLD: 0.9 % — HIGH (ref 0–0)
MICROCYTES BLD QL: SLIGHT — SIGNIFICANT CHANGE UP
MONOCYTES # BLD AUTO: 0.07 K/UL — SIGNIFICANT CHANGE UP (ref 0–0.9)
MONOCYTES NFR BLD AUTO: 0.9 % — LOW (ref 2–14)
NEUTROPHILS # BLD AUTO: 7.23 K/UL — SIGNIFICANT CHANGE UP (ref 1.8–7.4)
NEUTROPHILS NFR BLD AUTO: 92.8 % — HIGH (ref 43–77)
NEUTS HYPERSEG # BLD: PRESENT — SIGNIFICANT CHANGE UP
PCO2 BLDV: 27 MMHG — LOW (ref 42–55)
PH BLDV: 7.47 — HIGH (ref 7.32–7.43)
PLAT MORPH BLD: NORMAL — SIGNIFICANT CHANGE UP
PLATELET # BLD AUTO: 237 K/UL — SIGNIFICANT CHANGE UP (ref 150–400)
PO2 BLDV: 159 MMHG — HIGH (ref 25–45)
POLYCHROMASIA BLD QL SMEAR: SIGNIFICANT CHANGE UP
POTASSIUM BLDV-SCNC: 4.4 MMOL/L — SIGNIFICANT CHANGE UP (ref 3.5–5.1)
POTASSIUM SERPL-MCNC: 4.4 MMOL/L — SIGNIFICANT CHANGE UP (ref 3.5–5.3)
POTASSIUM SERPL-SCNC: 4.4 MMOL/L — SIGNIFICANT CHANGE UP (ref 3.5–5.3)
PROT SERPL-MCNC: 7 G/DL — SIGNIFICANT CHANGE UP (ref 6.6–8.7)
PROTHROM AB SERPL-ACNC: 12.9 SEC — SIGNIFICANT CHANGE UP (ref 9.5–13)
RBC # BLD: 4.24 M/UL — SIGNIFICANT CHANGE UP (ref 4.2–5.8)
RBC # FLD: 13.9 % — SIGNIFICANT CHANGE UP (ref 10.3–14.5)
RBC BLD AUTO: SIGNIFICANT CHANGE UP
RSV RNA NPH QL NAA+NON-PROBE: SIGNIFICANT CHANGE UP
SALICYLATES SERPL-MCNC: <0.6 MG/DL — LOW (ref 10–20)
SAO2 % BLDV: 99.4 % — SIGNIFICANT CHANGE UP
SARS-COV-2 RNA SPEC QL NAA+PROBE: SIGNIFICANT CHANGE UP
SODIUM SERPL-SCNC: 113 MMOL/L — CRITICAL LOW (ref 135–145)
WBC # BLD: 7.79 K/UL — SIGNIFICANT CHANGE UP (ref 3.8–10.5)
WBC # FLD AUTO: 7.79 K/UL — SIGNIFICANT CHANGE UP (ref 3.8–10.5)

## 2024-04-24 PROCEDURE — 93010 ELECTROCARDIOGRAM REPORT: CPT

## 2024-04-24 PROCEDURE — 71045 X-RAY EXAM CHEST 1 VIEW: CPT | Mod: 26

## 2024-04-24 PROCEDURE — 12001 RPR S/N/AX/GEN/TRNK 2.5CM/<: CPT

## 2024-04-24 PROCEDURE — 72125 CT NECK SPINE W/O DYE: CPT | Mod: 26,MC

## 2024-04-24 PROCEDURE — 70450 CT HEAD/BRAIN W/O DYE: CPT | Mod: 26,MC

## 2024-04-24 PROCEDURE — 99285 EMERGENCY DEPT VISIT HI MDM: CPT | Mod: 25

## 2024-04-24 RX ORDER — FOLIC ACID 0.8 MG
1 TABLET ORAL DAILY
Refills: 0 | Status: DISCONTINUED | OUTPATIENT
Start: 2024-04-24 | End: 2024-04-28

## 2024-04-24 RX ORDER — DIAZEPAM 5 MG
TABLET ORAL
Refills: 0 | Status: DISCONTINUED | OUTPATIENT
Start: 2024-04-24 | End: 2024-04-25

## 2024-04-24 RX ORDER — PANTOPRAZOLE SODIUM 20 MG/1
1 TABLET, DELAYED RELEASE ORAL
Refills: 0 | DISCHARGE

## 2024-04-24 RX ORDER — DIAZEPAM 5 MG
20 TABLET ORAL EVERY 6 HOURS
Refills: 0 | Status: DISCONTINUED | OUTPATIENT
Start: 2024-04-24 | End: 2024-04-25

## 2024-04-24 RX ORDER — THIAMINE MONONITRATE (VIT B1) 100 MG
100 TABLET ORAL DAILY
Refills: 0 | Status: DISCONTINUED | OUTPATIENT
Start: 2024-04-24 | End: 2024-04-28

## 2024-04-24 RX ORDER — DIAZEPAM 5 MG
5 TABLET ORAL ONCE
Refills: 0 | Status: DISCONTINUED | OUTPATIENT
Start: 2024-04-24 | End: 2024-04-24

## 2024-04-24 RX ORDER — MIDAZOLAM HYDROCHLORIDE 1 MG/ML
4 INJECTION, SOLUTION INTRAMUSCULAR; INTRAVENOUS ONCE
Refills: 0 | Status: DISCONTINUED | OUTPATIENT
Start: 2024-04-24 | End: 2024-04-24

## 2024-04-24 RX ORDER — METOPROLOL TARTRATE 50 MG
1 TABLET ORAL
Refills: 0 | DISCHARGE

## 2024-04-24 RX ORDER — FOLIC ACID 0.8 MG
1 TABLET ORAL
Refills: 0 | DISCHARGE

## 2024-04-24 RX ORDER — DIAZEPAM 5 MG
20 TABLET ORAL ONCE
Refills: 0 | Status: DISCONTINUED | OUTPATIENT
Start: 2024-04-24 | End: 2024-04-24

## 2024-04-24 RX ORDER — SODIUM CHLORIDE 9 MG/ML
1000 INJECTION INTRAMUSCULAR; INTRAVENOUS; SUBCUTANEOUS ONCE
Refills: 0 | Status: COMPLETED | OUTPATIENT
Start: 2024-04-24 | End: 2024-04-24

## 2024-04-24 RX ORDER — CHLORHEXIDINE GLUCONATE 213 G/1000ML
1 SOLUTION TOPICAL
Refills: 0 | Status: DISCONTINUED | OUTPATIENT
Start: 2024-04-24 | End: 2024-04-26

## 2024-04-24 RX ORDER — TETANUS TOXOID, REDUCED DIPHTHERIA TOXOID AND ACELLULAR PERTUSSIS VACCINE, ADSORBED 5; 2.5; 8; 8; 2.5 [IU]/.5ML; [IU]/.5ML; UG/.5ML; UG/.5ML; UG/.5ML
0.5 SUSPENSION INTRAMUSCULAR ONCE
Refills: 0 | Status: COMPLETED | OUTPATIENT
Start: 2024-04-24 | End: 2024-04-24

## 2024-04-24 RX ADMIN — Medication 20 MILLIGRAM(S): at 23:40

## 2024-04-24 RX ADMIN — Medication 5 MILLIGRAM(S): at 23:15

## 2024-04-24 RX ADMIN — SODIUM CHLORIDE 1000 MILLILITER(S): 9 INJECTION INTRAMUSCULAR; INTRAVENOUS; SUBCUTANEOUS at 22:05

## 2024-04-24 RX ADMIN — TETANUS TOXOID, REDUCED DIPHTHERIA TOXOID AND ACELLULAR PERTUSSIS VACCINE, ADSORBED 0.5 MILLILITER(S): 5; 2.5; 8; 8; 2.5 SUSPENSION INTRAMUSCULAR at 23:29

## 2024-04-24 RX ADMIN — MIDAZOLAM HYDROCHLORIDE 4 MILLIGRAM(S): 1 INJECTION, SOLUTION INTRAMUSCULAR; INTRAVENOUS at 21:56

## 2024-04-24 NOTE — ED ADULT NURSE NOTE - OBJECTIVE STATEMENT
PT BIBA after being found down in hotel room. as per ems PD went for wellness check. PT found on hotel floor with wound to posterior scalp and surrounded by beer cans. PT is currently altered responding to verbal stimulus. PT has noted hematoma to the posterior of scalp. no other obvious trauma noted PT breathing equally and unlabored on room air. Priority CT ordered.

## 2024-04-24 NOTE — ED PROVIDER NOTE - PHYSICAL EXAMINATION
VITAL SIGNS: I have reviewed nursing notes and confirm.  CONSTITUTIONAL:  in no acute distress.  SKIN: Skin exam is warm and dry, no acute rash.  HEAD: Normocephalic; (+) swelling to right eye brow,   EYES: PERRL, EOM intact; (+) sclera injected   ENT: No nasal discharge; airway clear. Throat clear.(+) nasal abrasion.   NECK: Supple; non tender.    CARD: Regular rate and rhythm.  RESP: No wheezes,  no rales or rhonchi.   ABD:  soft; non-distended; non-tender;   EXT: Normal ROM. No clubbing, cyanosis or edema.  NEURO: (+) AO x0.  moves all extremities, VITAL SIGNS: I have reviewed nursing notes and confirm.  CONSTITUTIONAL:  in no acute distress.  SKIN: Skin exam is warm and dry, no acute rash.  HEAD: Normocephalic; (+) swelling to right eye brow, scalp hematoma, 0.5 cm laceration x 2 to left parietal scalp   EYES: PERRL, EOM intact; (+) sclera injected   ENT: No nasal discharge; airway clear. Throat clear.(+) nasal abrasion.   NECK: Supple; non tender.    CARD: Regular rate and rhythm.  RESP: No wheezes,  no rales or rhonchi.   ABD:  soft; non-distended; non-tender;   EXT: Normal ROM. No clubbing, cyanosis or edema.  NEURO: (+) AO x0.  moves all extremities,

## 2024-04-24 NOTE — H&P ADULT - HISTORY OF PRESENT ILLNESS
37 yo m pmhx ETOH abuse, HTN, anxiety biba after being found altered on floor of hotel room.  Patient had recently gone through 28 day rehab in the [facility].  Patient recently  from his wife, has been living in a hotel for the last 2 weeks.  Patient's aunt calls him daily, didn't hear from him the last day or so, went to hotel and had police do wellness check.  Patient found on floor of hotel bathroom, altered and was promptly brought to ED.  In ED patient found with  39 yo m pmhx ETOH abuse, HTN, anxiety biba after being found altered on floor of hotel room.  Patient had recently gone through 28 day rehab in the [facility].  Patient recently  from his wife, has been living in a hotel for the last 2 weeks.  Patient's aunt calls him daily, didn't hear from him the last day or so, went to hotel and had police do wellness check.  Patient found on floor of hotel bathroom, surrounded by a bunch of beer cans altered and was promptly brought to ED.  In ED patient found with Na 113, elevated etoh level, lactate 7.1.  Patient given NS 1L and micu consulted.  Repeat Na 113, admit to MICU.

## 2024-04-24 NOTE — ED PROCEDURE NOTE - CPROC ED TOLERANCE1
Last visit: 7-  Next visit: 1-    Last labs   GOT/AST (Units/L)   Date Value   10/20/2023 21     GPT/ALT (Units/L)   Date Value   10/20/2023 32     Creatinine (mg/dL)   Date Value   10/20/2023 0.84     PLT (K/mcL)   Date Value   10/20/2023 339     Absolute Neutrophils (K/mcL)   Date Value   10/20/2023 4.1     WBC (K/mcL)   Date Value   10/20/2023 7.1     Quantiferon Plus Interpretation (no units)   Date Value   12/02/2022 Negative         CE checked: Yes  Recent labs in CareEverywhere:     Forwarded to prescribing physician for signature.   Patient tolerated procedure well.

## 2024-04-24 NOTE — ED PROVIDER NOTE - PROGRESS NOTE DETAILS
I spoke to aunt at bedside who report the patient has history of anxiety and alcohol abuse, he was recently at [facility] for detox program but relapsed.  Patient has been living at a hotel and was drinking.  patient is now more alert by report severe anxiety and is requesting medication.  Valium 5 mg IV ordered. Na 113, ICU consulted.

## 2024-04-24 NOTE — H&P ADULT - CRITICAL CARE ATTENDING COMMENT
Pt seen w PA and agree w above. PMhx sig for ETOH abuse and anxiety.  Pt reportedly undergoing life stressors, separation from his wife recent  rehab for alcohol detox.  Pt was not heard from by his family and they requested a wellness check at the hotel he was staying, he was found on the floor of his hotel room.  Numerous empty beer cans found and patient was on the floor, he had a slip and fall hitting his head on the bathroom floor. CT head did not reveal any trauma to his brain. Pt found to be hyponatremic. Possibly from beer potomania or due to multiple anxiety meds ie trazadone, sertraline.  Will monitor sodium level, aim to correct 6-8 meq/24hr. Benzodiazepines for etoh withdrawal.  Thiamine folic acid and b12. Monitor in ICU. Pt is at risk for deterioration.

## 2024-04-24 NOTE — ED ADULT TRIAGE NOTE - CHIEF COMPLAINT QUOTE
Pt BIBA from hotel w/AMS, unwitnessed head injury.  Per EMS, lac to rear of head, swelling to left brow.  Pt was found in hotel room 1 hr PTA w/injuries when family called to check on him.  Pt was noted to be out of the room around noon when servicing room, so LKW estimated at 12:00.  Unsure of hx.  Priority CT Pt BIBA from hotel w/AMS, unwitnessed head injury.  Per EMS, lac to rear of head, swelling to left brow.  Pt was found in hotel room 1 hr PTA w/injuries when family called to check on him, Pt noted to have 15-20 empty tall beer cans.  Pt was noted to be out of the room around noon when servicing room, so LKW estimated at 12:00.  Unsure of hx.  Priority CT.  . Pt BIBA from hotel w/AMS, unwitnessed head injury.  Per EMS, lac to rear of head, swelling to left brow.  Pt was found in hotel room 1 hr PTA w/injuries when family called to check on him, Pt noted to have 15-20 empty tall beer cans, defacated.  Pt was noted to be out of the hotel room around noon when servicing room, so LKW estimated at 12:00.  Unsure of hx.  Priority CT.  .

## 2024-04-24 NOTE — ED ADULT NURSE NOTE - CHIEF COMPLAINT QUOTE
Pt BIBA from hotel w/AMS, unwitnessed head injury.  Per EMS, lac to rear of head, swelling to left brow.  Pt was found in hotel room 1 hr PTA w/injuries when family called to check on him, Pt noted to have 15-20 empty tall beer cans, defacated.  Pt was noted to be out of the hotel room around noon when servicing room, so LKW estimated at 12:00.  Unsure of hx.  Priority CT.  .

## 2024-04-24 NOTE — H&P ADULT - ASSESSMENT
39 yo m pmhx ETOH abuse, HTN, anxiety admitted with     1. ETOH withdrawal   2. Hyponatremia   3. MA    NEURO:  ETOH withdrawal, placed on valium taper.  Will restart home meds as tolerated.    CV: close HD monitoring   RESP: No active issues  RENAL: Monitor urine output, bun/cr and electrolytes, replace lyes as needed.  elevated CK, trend level.  Hyponatremia, patient received ~250 cc NS between initial bmp and follow up bmp.  Repeat bmp for 0430.  Nephrology consult   GI: PO diet as tolerated  ENDO: No active issues  ID: No active infectious process  HEME: Lovenox for vte ppx   DISPO: Full code.  Patient's aunt Lori Camarena [phone].     DATE OF ENTRY OF THIS NOTE IS EQUAL TO THE DATE OF SERVICES RENDERED  39 yo m pmhx ETOH abuse, HTN, anxiety admitted with     1. ETOH withdrawal   2. Hyponatremia   3. MA    NEURO:  ETOH withdrawal, placed on valium taper.  Will restart home meds as tolerated.    CV: close HD monitoring   RESP: No active issues  RENAL: Monitor urine output, bun/cr and electrolytes, replace lyes as needed.  elevated CK, trend level.  Hyponatremia, patient received ~500 cc NS between initial bmp and follow up bmp.  Fluid restrict. Repeat bmp for 0430.  Nephrology consult   GI: PO diet as tolerated  ENDO: No active issues  ID: No active infectious process  HEME: Lovenox for vte ppx   DISPO: Full code.  Patient's aunt Lori Camarena [phone].     DATE OF ENTRY OF THIS NOTE IS EQUAL TO THE DATE OF SERVICES RENDERED

## 2024-04-24 NOTE — ED PROVIDER NOTE - CARE PLAN
1 Principal Discharge DX:	Head injury   Principal Discharge DX:	Head injury  Secondary Diagnosis:	Hyponatremia  Secondary Diagnosis:	Alcohol intoxication

## 2024-04-24 NOTE — ED PROVIDER NOTE - CLINICAL SUMMARY MEDICAL DECISION MAKING FREE TEXT BOX
39 yo M hx of etoh use disorder, HTN, Anxiety By in by ambulance and police for altered mental status and found down in a hotel room.  EMS report family member call to check on him.   He did not respond.  They called the police and found patient on the hotel floor with injury to his head and empty bottles of beer around him.  Patient admits to drinking.  However patient is AO x 0 unable to tell me what happened.        Patient has facial swelling and an injury to the posterior head.  will get blood work, alcohol level, CT head and cervical spine.  Chest x-ray. 37 yo M hx of etoh use disorder, HTN, Anxiety By in by ambulance and police for altered mental status and found down in a hotel room.  EMS report family member call to check on him.   He did not respond.  They called the police and found patient on the hotel floor with injury to his head and empty bottles of beer around him.  Patient admits to drinking.  However patient is AO x 0 unable to tell me what happened.        Patient has facial swelling and an injury to the posterior head. CT head and cervical spine negative for acute fracture or intracranial bleed.  Patient found to have lactic of 7.1,  113 and gap of 23, no hyperglycemia.  Hyponatremia likely secondary to beer potomania.  Alcohol 342.  CK5 24.  Patient given IV fluids.  ICU consulted, will admit to ICU.

## 2024-04-24 NOTE — ED PROVIDER NOTE - OBJECTIVE STATEMENT
39 yo M hx of etoh use disorder, HTN, Anxiety By in by ambulance and police for altered mental status and found down in a hotel room.  EMS report family member call to check on him.   He did not respond.  They called the police and found patient on the hotel floor with injury to his head and empty bottles of beer around him.  Patient admits to drinking.  However patient is AO x 0 unable to tell me what happened.  Prior to CT scan ordered.

## 2024-04-25 DIAGNOSIS — S09.90XA UNSPECIFIED INJURY OF HEAD, INITIAL ENCOUNTER: ICD-10-CM

## 2024-04-25 PROBLEM — F10.10 ALCOHOL ABUSE, UNCOMPLICATED: Chronic | Status: ACTIVE | Noted: 2023-10-17

## 2024-04-25 PROBLEM — I10 ESSENTIAL (PRIMARY) HYPERTENSION: Chronic | Status: ACTIVE | Noted: 2023-10-18

## 2024-04-25 PROBLEM — F32.9 MAJOR DEPRESSIVE DISORDER, SINGLE EPISODE, UNSPECIFIED: Chronic | Status: ACTIVE | Noted: 2023-10-17

## 2024-04-25 LAB
AMPHET UR-MCNC: NEGATIVE — SIGNIFICANT CHANGE UP
ANION GAP SERPL CALC-SCNC: 15 MMOL/L — SIGNIFICANT CHANGE UP (ref 5–17)
ANION GAP SERPL CALC-SCNC: 15 MMOL/L — SIGNIFICANT CHANGE UP (ref 5–17)
ANION GAP SERPL CALC-SCNC: 18 MMOL/L — HIGH (ref 5–17)
ANION GAP SERPL CALC-SCNC: 20 MMOL/L — HIGH (ref 5–17)
ANION GAP SERPL CALC-SCNC: 20 MMOL/L — HIGH (ref 5–17)
APPEARANCE UR: CLEAR — SIGNIFICANT CHANGE UP
BARBITURATES UR SCN-MCNC: NEGATIVE — SIGNIFICANT CHANGE UP
BENZODIAZ UR-MCNC: POSITIVE
BILIRUB UR-MCNC: NEGATIVE — SIGNIFICANT CHANGE UP
BUN SERPL-MCNC: 10 MG/DL — SIGNIFICANT CHANGE UP (ref 8–20)
BUN SERPL-MCNC: 10.9 MG/DL — SIGNIFICANT CHANGE UP (ref 8–20)
BUN SERPL-MCNC: 11 MG/DL — SIGNIFICANT CHANGE UP (ref 8–20)
BUN SERPL-MCNC: 12.6 MG/DL — SIGNIFICANT CHANGE UP (ref 8–20)
BUN SERPL-MCNC: 13 MG/DL — SIGNIFICANT CHANGE UP (ref 8–20)
CALCIUM SERPL-MCNC: 7.6 MG/DL — LOW (ref 8.4–10.5)
CALCIUM SERPL-MCNC: 7.7 MG/DL — LOW (ref 8.4–10.5)
CALCIUM SERPL-MCNC: 8.1 MG/DL — LOW (ref 8.4–10.5)
CALCIUM SERPL-MCNC: 8.3 MG/DL — LOW (ref 8.4–10.5)
CALCIUM SERPL-MCNC: 8.4 MG/DL — SIGNIFICANT CHANGE UP (ref 8.4–10.5)
CHLORIDE SERPL-SCNC: 75 MMOL/L — LOW (ref 96–108)
CHLORIDE SERPL-SCNC: 83 MMOL/L — LOW (ref 96–108)
CHLORIDE SERPL-SCNC: 83 MMOL/L — LOW (ref 96–108)
CHLORIDE SERPL-SCNC: 84 MMOL/L — LOW (ref 96–108)
CHLORIDE SERPL-SCNC: 85 MMOL/L — LOW (ref 96–108)
CK MB CFR SERPL CALC: 5.1 NG/ML — SIGNIFICANT CHANGE UP (ref 0–6.7)
CK SERPL-CCNC: 598 U/L — HIGH (ref 30–200)
CO2 SERPL-SCNC: 18 MMOL/L — LOW (ref 22–29)
CO2 SERPL-SCNC: 18 MMOL/L — LOW (ref 22–29)
CO2 SERPL-SCNC: 20 MMOL/L — LOW (ref 22–29)
CO2 SERPL-SCNC: 22 MMOL/L — SIGNIFICANT CHANGE UP (ref 22–29)
CO2 SERPL-SCNC: 23 MMOL/L — SIGNIFICANT CHANGE UP (ref 22–29)
COCAINE METAB.OTHER UR-MCNC: NEGATIVE — SIGNIFICANT CHANGE UP
COLOR SPEC: YELLOW — SIGNIFICANT CHANGE UP
CREAT SERPL-MCNC: 0.42 MG/DL — LOW (ref 0.5–1.3)
CREAT SERPL-MCNC: 0.46 MG/DL — LOW (ref 0.5–1.3)
CREAT SERPL-MCNC: 0.53 MG/DL — SIGNIFICANT CHANGE UP (ref 0.5–1.3)
CREAT SERPL-MCNC: 0.58 MG/DL — SIGNIFICANT CHANGE UP (ref 0.5–1.3)
CREAT SERPL-MCNC: 0.63 MG/DL — SIGNIFICANT CHANGE UP (ref 0.5–1.3)
DIFF PNL FLD: NEGATIVE — SIGNIFICANT CHANGE UP
EGFR: 125 ML/MIN/1.73M2 — SIGNIFICANT CHANGE UP
EGFR: 128 ML/MIN/1.73M2 — SIGNIFICANT CHANGE UP
EGFR: 132 ML/MIN/1.73M2 — SIGNIFICANT CHANGE UP
EGFR: 137 ML/MIN/1.73M2 — SIGNIFICANT CHANGE UP
EGFR: 141 ML/MIN/1.73M2 — SIGNIFICANT CHANGE UP
GLUCOSE SERPL-MCNC: 140 MG/DL — HIGH (ref 70–99)
GLUCOSE SERPL-MCNC: 91 MG/DL — SIGNIFICANT CHANGE UP (ref 70–99)
GLUCOSE SERPL-MCNC: 92 MG/DL — SIGNIFICANT CHANGE UP (ref 70–99)
GLUCOSE SERPL-MCNC: 93 MG/DL — SIGNIFICANT CHANGE UP (ref 70–99)
GLUCOSE SERPL-MCNC: 96 MG/DL — SIGNIFICANT CHANGE UP (ref 70–99)
GLUCOSE UR QL: 100 MG/DL
HCT VFR BLD CALC: 34.8 % — LOW (ref 39–50)
HGB BLD-MCNC: 13 G/DL — SIGNIFICANT CHANGE UP (ref 13–17)
HIV 1 & 2 AB SERPL IA.RAPID: SIGNIFICANT CHANGE UP
KETONES UR-MCNC: ABNORMAL MG/DL
LACTATE SERPL-SCNC: 4 MMOL/L — CRITICAL HIGH (ref 0.5–2)
LACTATE SERPL-SCNC: 4.6 MMOL/L — CRITICAL HIGH (ref 0.5–2)
LEUKOCYTE ESTERASE UR-ACNC: NEGATIVE — SIGNIFICANT CHANGE UP
MAGNESIUM SERPL-MCNC: 2.1 MG/DL — SIGNIFICANT CHANGE UP (ref 1.6–2.6)
MAGNESIUM SERPL-MCNC: 2.2 MG/DL — SIGNIFICANT CHANGE UP (ref 1.6–2.6)
MAGNESIUM SERPL-MCNC: 2.3 MG/DL — SIGNIFICANT CHANGE UP (ref 1.6–2.6)
MCHC RBC-ENTMCNC: 31 PG — SIGNIFICANT CHANGE UP (ref 27–34)
MCHC RBC-ENTMCNC: SIGNIFICANT CHANGE UP (ref 32–36)
MCV RBC AUTO: 82.9 FL — SIGNIFICANT CHANGE UP (ref 80–100)
METHADONE UR-MCNC: NEGATIVE — SIGNIFICANT CHANGE UP
MRSA PCR RESULT.: SIGNIFICANT CHANGE UP
NITRITE UR-MCNC: NEGATIVE — SIGNIFICANT CHANGE UP
OPIATES UR-MCNC: NEGATIVE — SIGNIFICANT CHANGE UP
OSMOLALITY SERPL: 282 MOSMOL/KG — SIGNIFICANT CHANGE UP (ref 275–300)
OSMOLALITY UR: 254 MOSM/KG — LOW (ref 300–1000)
PCP SPEC-MCNC: SIGNIFICANT CHANGE UP
PCP UR-MCNC: NEGATIVE — SIGNIFICANT CHANGE UP
PH UR: 6 — SIGNIFICANT CHANGE UP (ref 5–8)
PHOSPHATE SERPL-MCNC: 2.3 MG/DL — LOW (ref 2.4–4.7)
PHOSPHATE SERPL-MCNC: 2.4 MG/DL — SIGNIFICANT CHANGE UP (ref 2.4–4.7)
PHOSPHATE SERPL-MCNC: 3.3 MG/DL — SIGNIFICANT CHANGE UP (ref 2.4–4.7)
PLATELET # BLD AUTO: 236 K/UL — SIGNIFICANT CHANGE UP (ref 150–400)
POTASSIUM SERPL-MCNC: 3.9 MMOL/L — SIGNIFICANT CHANGE UP (ref 3.5–5.3)
POTASSIUM SERPL-MCNC: 3.9 MMOL/L — SIGNIFICANT CHANGE UP (ref 3.5–5.3)
POTASSIUM SERPL-MCNC: 4 MMOL/L — SIGNIFICANT CHANGE UP (ref 3.5–5.3)
POTASSIUM SERPL-MCNC: 4.1 MMOL/L — SIGNIFICANT CHANGE UP (ref 3.5–5.3)
POTASSIUM SERPL-MCNC: 4.2 MMOL/L — SIGNIFICANT CHANGE UP (ref 3.5–5.3)
POTASSIUM SERPL-SCNC: 3.9 MMOL/L — SIGNIFICANT CHANGE UP (ref 3.5–5.3)
POTASSIUM SERPL-SCNC: 3.9 MMOL/L — SIGNIFICANT CHANGE UP (ref 3.5–5.3)
POTASSIUM SERPL-SCNC: 4 MMOL/L — SIGNIFICANT CHANGE UP (ref 3.5–5.3)
POTASSIUM SERPL-SCNC: 4.1 MMOL/L — SIGNIFICANT CHANGE UP (ref 3.5–5.3)
POTASSIUM SERPL-SCNC: 4.2 MMOL/L — SIGNIFICANT CHANGE UP (ref 3.5–5.3)
PROT UR-MCNC: NEGATIVE MG/DL — SIGNIFICANT CHANGE UP
RBC # BLD: 4.2 M/UL — SIGNIFICANT CHANGE UP (ref 4.2–5.8)
RBC # FLD: 14.2 % — SIGNIFICANT CHANGE UP (ref 10.3–14.5)
S AUREUS DNA NOSE QL NAA+PROBE: SIGNIFICANT CHANGE UP
SODIUM SERPL-SCNC: 113 MMOL/L — CRITICAL LOW (ref 135–145)
SODIUM SERPL-SCNC: 121 MMOL/L — LOW (ref 135–145)
SODIUM SERPL-SCNC: 121 MMOL/L — LOW (ref 135–145)
SODIUM SERPL-SCNC: 122 MMOL/L — LOW (ref 135–145)
SODIUM SERPL-SCNC: 122 MMOL/L — LOW (ref 135–145)
SODIUM UR-SCNC: <30 MMOL/L — SIGNIFICANT CHANGE UP
SP GR SPEC: 1.01 — SIGNIFICANT CHANGE UP (ref 1–1.03)
THC UR QL: POSITIVE
UROBILINOGEN FLD QL: 0.2 MG/DL — SIGNIFICANT CHANGE UP (ref 0.2–1)
WBC # BLD: 8.16 K/UL — SIGNIFICANT CHANGE UP (ref 3.8–10.5)
WBC # FLD AUTO: 8.16 K/UL — SIGNIFICANT CHANGE UP (ref 3.8–10.5)

## 2024-04-25 PROCEDURE — 99233 SBSQ HOSP IP/OBS HIGH 50: CPT

## 2024-04-25 RX ORDER — GABAPENTIN 400 MG/1
300 CAPSULE ORAL
Refills: 0 | Status: DISCONTINUED | OUTPATIENT
Start: 2024-04-25 | End: 2024-04-25

## 2024-04-25 RX ORDER — OMEPRAZOLE 10 MG/1
1 CAPSULE, DELAYED RELEASE ORAL
Refills: 0 | DISCHARGE

## 2024-04-25 RX ORDER — PANTOPRAZOLE SODIUM 20 MG/1
40 TABLET, DELAYED RELEASE ORAL
Refills: 0 | Status: DISCONTINUED | OUTPATIENT
Start: 2024-04-25 | End: 2024-04-28

## 2024-04-25 RX ORDER — SODIUM CHLORIDE 9 MG/ML
250 INJECTION, SOLUTION INTRAVENOUS
Refills: 0 | Status: DISCONTINUED | OUTPATIENT
Start: 2024-04-25 | End: 2024-04-25

## 2024-04-25 RX ORDER — SODIUM CHLORIDE 9 MG/ML
500 INJECTION, SOLUTION INTRAVENOUS
Refills: 0 | Status: DISCONTINUED | OUTPATIENT
Start: 2024-04-25 | End: 2024-04-25

## 2024-04-25 RX ORDER — TRAZODONE HCL 50 MG
100 TABLET ORAL AT BEDTIME
Refills: 0 | Status: DISCONTINUED | OUTPATIENT
Start: 2024-04-25 | End: 2024-04-25

## 2024-04-25 RX ORDER — ONDANSETRON 8 MG/1
4 TABLET, FILM COATED ORAL EVERY 6 HOURS
Refills: 0 | Status: DISCONTINUED | OUTPATIENT
Start: 2024-04-25 | End: 2024-04-28

## 2024-04-25 RX ORDER — GABAPENTIN 400 MG/1
0 CAPSULE ORAL
Refills: 0 | DISCHARGE

## 2024-04-25 RX ORDER — TRAZODONE HCL 50 MG
50 TABLET ORAL
Refills: 0 | DISCHARGE

## 2024-04-25 RX ORDER — SERTRALINE 25 MG/1
50 TABLET, FILM COATED ORAL DAILY
Refills: 0 | Status: DISCONTINUED | OUTPATIENT
Start: 2024-04-25 | End: 2024-04-25

## 2024-04-25 RX ORDER — PROPRANOLOL HCL 160 MG
1 CAPSULE, EXTENDED RELEASE 24HR ORAL
Refills: 0 | DISCHARGE

## 2024-04-25 RX ORDER — NALTREXONE HYDROCHLORIDE 50 MG/1
1 TABLET, FILM COATED ORAL
Refills: 0 | DISCHARGE

## 2024-04-25 RX ORDER — DESMOPRESSIN ACETATE 0.1 MG/1
2 TABLET ORAL ONCE
Refills: 0 | Status: COMPLETED | OUTPATIENT
Start: 2024-04-25 | End: 2024-04-25

## 2024-04-25 RX ORDER — ENOXAPARIN SODIUM 100 MG/ML
40 INJECTION SUBCUTANEOUS EVERY 24 HOURS
Refills: 0 | Status: DISCONTINUED | OUTPATIENT
Start: 2024-04-25 | End: 2024-04-28

## 2024-04-25 RX ORDER — FOLIC ACID 0.8 MG
1 TABLET ORAL
Refills: 0 | DISCHARGE

## 2024-04-25 RX ORDER — DIAZEPAM 5 MG
20 TABLET ORAL
Refills: 0 | Status: DISCONTINUED | OUTPATIENT
Start: 2024-04-25 | End: 2024-04-25

## 2024-04-25 RX ORDER — SODIUM CHLORIDE 9 MG/ML
250 INJECTION, SOLUTION INTRAVENOUS
Refills: 0 | Status: COMPLETED | OUTPATIENT
Start: 2024-04-25 | End: 2024-04-25

## 2024-04-25 RX ORDER — INFLUENZA VIRUS VACCINE 15; 15; 15; 15 UG/.5ML; UG/.5ML; UG/.5ML; UG/.5ML
0.5 SUSPENSION INTRAMUSCULAR ONCE
Refills: 0 | Status: DISCONTINUED | OUTPATIENT
Start: 2024-04-25 | End: 2024-04-28

## 2024-04-25 RX ADMIN — Medication 20 MILLIGRAM(S): at 04:50

## 2024-04-25 RX ADMIN — Medication 1 MILLIGRAM(S): at 11:26

## 2024-04-25 RX ADMIN — Medication 20 MILLIGRAM(S): at 06:37

## 2024-04-25 RX ADMIN — PANTOPRAZOLE SODIUM 40 MILLIGRAM(S): 20 TABLET, DELAYED RELEASE ORAL at 05:11

## 2024-04-25 RX ADMIN — SODIUM CHLORIDE 500 MILLILITER(S): 9 INJECTION, SOLUTION INTRAVENOUS at 05:43

## 2024-04-25 RX ADMIN — Medication 2 MILLIGRAM(S): at 11:26

## 2024-04-25 RX ADMIN — SODIUM CHLORIDE 999 MILLILITER(S): 9 INJECTION, SOLUTION INTRAVENOUS at 11:39

## 2024-04-25 RX ADMIN — Medication 25 MILLIGRAM(S): at 14:32

## 2024-04-25 RX ADMIN — Medication 20 MILLIGRAM(S): at 09:01

## 2024-04-25 RX ADMIN — Medication 25 MILLIGRAM(S): at 21:17

## 2024-04-25 RX ADMIN — Medication 20 MILLIGRAM(S): at 07:25

## 2024-04-25 RX ADMIN — DESMOPRESSIN ACETATE 2 MICROGRAM(S): 0.1 TABLET ORAL at 06:11

## 2024-04-25 RX ADMIN — Medication 2 MILLIGRAM(S): at 23:04

## 2024-04-25 RX ADMIN — Medication 1 TABLET(S): at 11:26

## 2024-04-25 RX ADMIN — ENOXAPARIN SODIUM 40 MILLIGRAM(S): 100 INJECTION SUBCUTANEOUS at 05:11

## 2024-04-25 RX ADMIN — Medication 100 MILLIGRAM(S): at 11:26

## 2024-04-25 RX ADMIN — Medication 20 MILLIGRAM(S): at 02:14

## 2024-04-25 RX ADMIN — ONDANSETRON 4 MILLIGRAM(S): 8 TABLET, FILM COATED ORAL at 20:20

## 2024-04-25 RX ADMIN — CHLORHEXIDINE GLUCONATE 1 APPLICATION(S): 213 SOLUTION TOPICAL at 05:11

## 2024-04-25 RX ADMIN — Medication 20 MILLIGRAM(S): at 01:07

## 2024-04-25 NOTE — SBIRT NOTE ADULT - NSSBIRTBRIEFINTDET_GEN_A_CORE
pt reports he will cut down, has been in treatment, in December 2023, does not want to do this again.  Education given

## 2024-04-25 NOTE — PATIENT PROFILE ADULT - FALL HARM RISK - HARM RISK INTERVENTIONS
Assistance with ambulation/Assistance OOB with selected safe patient handling equipment/Communicate Risk of Fall with Harm to all staff/Monitor for mental status changes/Monitor gait and stability/Reinforce activity limits and safety measures with patient and family/Tailored Fall Risk Interventions/Toileting schedule using arm’s reach rule for commode and bathroom/Use of alarms - bed, chair and/or voice tab/Visual Cue: Yellow wristband and red socks/Bed in lowest position, wheels locked, appropriate side rails in place/Call bell, personal items and telephone in reach/Instruct patient to call for assistance before getting out of bed or chair/Non-slip footwear when patient is out of bed/[employer] to call system/Physically safe environment - no spills, clutter or unnecessary equipment/Purposeful Proactive Rounding/Room/bathroom lighting operational, light cord in reach

## 2024-04-25 NOTE — SBIRT NOTE ADULT - NSSBIRTNALOXDUR_GEN_A_CORE
30 Check here if all serologies below were negative, non-reactive or immune. Otherwise select appropriate status.

## 2024-04-25 NOTE — PATIENT PROFILE ADULT - ARE SIGNIFICANT INDICATORS COMPLETE.
Pt discharged home with mother and belongings. Parent's questions answered to satisfaction. Car seat installed appropriately.    Yes

## 2024-04-25 NOTE — PROGRESS NOTE ADULT - ASSESSMENT
38M PMH EtOH abuse (12 beers/day), HTN, anxiety who presents after being found down and altered, brought to ED found with Na 113 and elevated lactate, admitted to ICU for management of hyponatremia and EtOH withdrawal. 38M PMH EtOH abuse (12 beers/day), HTN, anxiety who presents after being found down and altered, brought to ED found with Na 113 and elevated lactate, admitted to ICU for management of hyponatremia and EtOH withdrawal.    Impression/Plan:    Hyponatremia - possibly beer potomania, hypervolemic  - Pending urine studies, osmolality  - Overcorrected overnight and was given 500cc D5W and 2mcg DDAVP  - Given another 250cc D5W this morning  - Pending repeat labs, trend Q4-6H  - Monitor UOP closely    EtOH Abuse  EtOH withdrawal  - Ativan 2mg IV Q2H PRN  - Start Librium 25 PO Q8H  - Trend CIWA scores  - Thiamine/folic acid/MVI    Diet - Regular with 1L fluid restriction  PPx - Lovenox 40 QD  Lines/Tubes - PIV  Code Status - Full code  Dispo - If Na remains stable and no severe withdrawal symptoms will aim to downgrade this evening      Philipp Ramos M.D.  , Pulmonary & Critical Care Medicine  [facility]  Pulmonary [facility] Medicine at [place]  [address]  [place] [address]  T: [phone]  F: [phone]
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
## 2024-04-25 NOTE — PROGRESS NOTE ADULT - SUBJECTIVE AND OBJECTIVE BOX
Repeat Na 121 this am after 1L NS in ED followed by fluid restriction.  Patient ordered for DDAVP and bolus 500 D5.  Repeat BMP for 0900.

## 2024-04-26 LAB
ANION GAP SERPL CALC-SCNC: 17 MMOL/L — SIGNIFICANT CHANGE UP (ref 5–17)
BUN SERPL-MCNC: 11.2 MG/DL — SIGNIFICANT CHANGE UP (ref 8–20)
CALCIUM SERPL-MCNC: 8.7 MG/DL — SIGNIFICANT CHANGE UP (ref 8.4–10.5)
CHLORIDE SERPL-SCNC: 89 MMOL/L — LOW (ref 96–108)
CO2 SERPL-SCNC: 21 MMOL/L — LOW (ref 22–29)
CREAT SERPL-MCNC: 0.62 MG/DL — SIGNIFICANT CHANGE UP (ref 0.5–1.3)
EGFR: 125 ML/MIN/1.73M2 — SIGNIFICANT CHANGE UP
GLUCOSE SERPL-MCNC: 92 MG/DL — SIGNIFICANT CHANGE UP (ref 70–99)
HCT VFR BLD CALC: 34.9 % — LOW (ref 39–50)
HGB BLD-MCNC: 12.8 G/DL — LOW (ref 13–17)
MAGNESIUM SERPL-MCNC: 2.3 MG/DL — SIGNIFICANT CHANGE UP (ref 1.6–2.6)
MCHC RBC-ENTMCNC: 30.5 PG — SIGNIFICANT CHANGE UP (ref 27–34)
MCHC RBC-ENTMCNC: 36.7 GM/DL — HIGH (ref 32–36)
MCV RBC AUTO: 83.1 FL — SIGNIFICANT CHANGE UP (ref 80–100)
PHOSPHATE SERPL-MCNC: 1.9 MG/DL — LOW (ref 2.4–4.7)
PLATELET # BLD AUTO: 199 K/UL — SIGNIFICANT CHANGE UP (ref 150–400)
POTASSIUM SERPL-MCNC: 3.5 MMOL/L — SIGNIFICANT CHANGE UP (ref 3.5–5.3)
POTASSIUM SERPL-SCNC: 3.5 MMOL/L — SIGNIFICANT CHANGE UP (ref 3.5–5.3)
RBC # BLD: 4.2 M/UL — SIGNIFICANT CHANGE UP (ref 4.2–5.8)
RBC # FLD: 14.1 % — SIGNIFICANT CHANGE UP (ref 10.3–14.5)
SODIUM SERPL-SCNC: 126 MMOL/L — LOW (ref 135–145)
WBC # BLD: 5.34 K/UL — SIGNIFICANT CHANGE UP (ref 3.8–10.5)
WBC # FLD AUTO: 5.34 K/UL — SIGNIFICANT CHANGE UP (ref 3.8–10.5)

## 2024-04-26 PROCEDURE — 99233 SBSQ HOSP IP/OBS HIGH 50: CPT

## 2024-04-26 RX ORDER — POTASSIUM PHOSPHATE, MONOBASIC POTASSIUM PHOSPHATE, DIBASIC 236; 224 MG/ML; MG/ML
30 INJECTION, SOLUTION INTRAVENOUS ONCE
Refills: 0 | Status: COMPLETED | OUTPATIENT
Start: 2024-04-26 | End: 2024-04-26

## 2024-04-26 RX ADMIN — Medication 25 MILLIGRAM(S): at 05:27

## 2024-04-26 RX ADMIN — ONDANSETRON 4 MILLIGRAM(S): 8 TABLET, FILM COATED ORAL at 19:55

## 2024-04-26 RX ADMIN — ONDANSETRON 4 MILLIGRAM(S): 8 TABLET, FILM COATED ORAL at 13:20

## 2024-04-26 RX ADMIN — Medication 2 MILLIGRAM(S): at 08:35

## 2024-04-26 RX ADMIN — POTASSIUM PHOSPHATE, MONOBASIC POTASSIUM PHOSPHATE, DIBASIC 83.33 MILLIMOLE(S): 236; 224 INJECTION, SOLUTION INTRAVENOUS at 08:36

## 2024-04-26 RX ADMIN — Medication 1 MILLIGRAM(S): at 12:08

## 2024-04-26 RX ADMIN — Medication 25 MILLIGRAM(S): at 22:15

## 2024-04-26 RX ADMIN — Medication 1 TABLET(S): at 12:08

## 2024-04-26 RX ADMIN — Medication 100 MILLIGRAM(S): at 12:08

## 2024-04-26 RX ADMIN — Medication 2 MILLIGRAM(S): at 14:56

## 2024-04-26 RX ADMIN — Medication 25 MILLIGRAM(S): at 13:19

## 2024-04-26 RX ADMIN — PANTOPRAZOLE SODIUM 40 MILLIGRAM(S): 20 TABLET, DELAYED RELEASE ORAL at 05:25

## 2024-04-26 RX ADMIN — ONDANSETRON 4 MILLIGRAM(S): 8 TABLET, FILM COATED ORAL at 02:19

## 2024-04-26 RX ADMIN — Medication 2 MILLIGRAM(S): at 19:51

## 2024-04-26 RX ADMIN — ENOXAPARIN SODIUM 40 MILLIGRAM(S): 100 INJECTION SUBCUTANEOUS at 05:25

## 2024-04-26 NOTE — PROGRESS NOTE ADULT - SUBJECTIVE AND OBJECTIVE BOX
YOBANY ALEX  ----------------------------------------  The patient was seen earlier at bedside. Patient with hyponatremia. Reported feeling anxious. Admitted to nausea. Denied chest pain of palpitations.    37 yo m pmhx ETOH abuse, HTN, anxiety biba after being found altered on floor of hotel room.  Patient had recently gone through 28 day rehab in the [facility].  Patient recently  from his wife, has been living in a hotel for the last 2 weeks.  Patient's aunt calls him daily, didn't hear from him the last day or so, went to [facility] and had police do wellness check.  Patient found on floor of hotel bathroom, surrounded by a bunch of beer cans altered and was promptly brought to ED.  In ED patient found with Na 113, elevated etoh level, lactate 7.1.  Patient given NS 1L and micu consulted.  Repeat Na 113, admit to MICU.      Vital Signs Last 24 Hrs  T(C): 37.1 (26 Apr 2024 03:11), Max: 37.1 (25 Apr 2024 23:00)  T(F): 98.8 (26 Apr 2024 03:11), Max: 98.8 (26 Apr 2024 03:11)  HR: 84 (26 Apr 2024 07:00) (75 - 116)  BP: 109/81 (26 Apr 2024 07:00) (100/73 - 143/102)  BP(mean): 90 (26 Apr 2024 07:00) (82 - 116)  RR: 15 (26 Apr 2024 07:00) (11 - 22)  SpO2: 97% (26 Apr 2024 07:00) (94% - 100%)    Parameters below as of 26 Apr 2024 04:00  Patient On (Oxygen Delivery Method): room air    PHYSICAL EXAMINATION:  ----------------------------------------  General appearance: No acute distress, Awake, Alert  HEENT: Normocephalic, Atraumatic, Conjunctiva clear, EOMI  Neck: Supple, No JVD, No tenderness  Lungs: Breath sound equal bilaterally, No wheezes, No rales  Cardiovascular: S1S2, Regular rhythm  Abdomen: Soft, Nontender, Nondistended, No guarding/rebound, Positive bowel sounds  Extremities: No clubbing, No cyanosis, No edema, No calf tenderness  Neuro: Strength equal bilaterally, Mild tremors  Psychiatric: Appropriate mood, Normal affect    LABORATORY STUDIES:  ----------------------------------------             12.8   5.34  )-----------( 199      ( 26 Apr 2024 02:23 )             34.9     04-26    126<L>  |  89<L>  |  11.2  ----------------------------<  92  3.5   |  21.0<L>  |  0.62    Ca    8.7      26 Apr 2024 02:23  Phos  1.9     04-26  Mg     2.3     04-26    TPro  7.0  /  Alb  4.3  /  TBili  1.2  /  DBili  x   /  AST  120<H>  /  ALT  60<H>  /  AlkPhos  91  04-24    LIVER FUNCTIONS - ( 24 Apr 2024 22:04 )  Alb: 4.3 g/dL / Pro: 7.0 g/dL / ALK PHOS: 91 U/L / ALT: 60 U/L / AST: 120 U/L / GGT: x           PT/INR - ( 24 Apr 2024 22:04 )   PT: 12.9 sec;   INR: 1.17 ratio    PTT - ( 24 Apr 2024 22:04 )  PTT:28.4 sec    Urinalysis Basic - ( 26 Apr 2024 02:23 )  Color: x / Appearance: x / SG: x / pH: x  Gluc: 92 mg/dL / Ketone: x  / Bili: x / Urobili: x   Blood: x / Protein: x / Nitrite: x   Leuk Esterase: x / RBC: x / WBC x   Sq Epi: x / Non Sq Epi: x / Bacteria: x    MEDICATIONS  (STANDING):  chlordiazePOXIDE 25 milliGRAM(s) Oral every 8 hours  enoxaparin Injectable 40 milliGRAM(s) SubCutaneous every 24 hours  folic acid 1 milliGRAM(s) Oral daily  influenza   Vaccine 0.5 milliLiter(s) IntraMuscular once  multivitamin 1 Tablet(s) Oral daily  pantoprazole    Tablet 40 milliGRAM(s) Oral before breakfast  thiamine 100 milliGRAM(s) Oral daily    MEDICATIONS  (PRN):  LORazepam   Injectable 2 milliGRAM(s) IV Push every 2 hours PRN Anxiety, Agitation  ondansetron Injectable 4 milliGRAM(s) IV Push every 6 hours PRN Nausea and/or Vomiting  propranolol 10 milliGRAM(s) Oral two times a day PRN Anxiety      ASSESSMENT / PLAN:  ----------------------------------------  38M with a history of anxiety and alcohol abuse who presented after being found on the floor in the hotel with evidence of empty beer cans. He was noted to have a sodium of 113 and was admitted to the intensive care unit. Intravenous fluids were administered in the emergency department with overcorrection in the sodium level requiring administration of dextrose and DDAVP. Repeat studies noted continued improvement in the hyponatremia with fluid restriction.    Hyponatremia  - Suspect secondary to beer potomania  - Continue on fluid restriction  - Close monitoring of sodium levels    Alcohol abuse / Withdrawal  - On chlordiazepoxide with lorazepam as needed  - [facility] protocol in progress  - For supplement of phosphorous  - Thiamine and folic acid supplementation  - Alcohol cessation discussed    Anxiety  - For resumption of sertraline if sodium level continues to improve YOBANY ALEX  ----------------------------------------  The patient was seen earlier at bedside. Patient with hyponatremia. Reported feeling anxious. Admitted to nausea. Denied chest pain of palpitations.    37 yo m pmhx ETOH abuse, HTN, anxiety biba after being found altered on floor of hotel room.  Patient had recently gone through 28 day rehab in the [facility].  Patient recently  from his wife, has been living in a hotel for the last 2 weeks.  Patient's aunt calls him daily, didn't hear from him the last day or so, went to [facility] and had police do wellness check.  Patient found on floor of hotel bathroom, surrounded by a bunch of beer cans altered and was promptly brought to ED.  In ED patient found with Na 113, elevated etoh level, lactate 7.1.  Patient given NS 1L and micu consulted.  Repeat Na 113, admit to MICU.      Vital Signs Last 24 Hrs  T(C): 37.1 (26 Apr 2024 03:11), Max: 37.1 (25 Apr 2024 23:00)  T(F): 98.8 (26 Apr 2024 03:11), Max: 98.8 (26 Apr 2024 03:11)  HR: 84 (26 Apr 2024 07:00) (75 - 116)  BP: 109/81 (26 Apr 2024 07:00) (100/73 - 143/102)  BP(mean): 90 (26 Apr 2024 07:00) (82 - 116)  RR: 15 (26 Apr 2024 07:00) (11 - 22)  SpO2: 97% (26 Apr 2024 07:00) (94% - 100%)    Parameters below as of 26 Apr 2024 04:00  Patient On (Oxygen Delivery Method): room air    PHYSICAL EXAMINATION:  ----------------------------------------  General appearance: No acute distress, Awake, Alert  HEENT: Normocephalic, Atraumatic, Conjunctiva clear, EOMI  Neck: Supple, No JVD, No tenderness  Lungs: Breath sound equal bilaterally, No wheezes, No rales  Cardiovascular: S1S2, Regular rhythm  Abdomen: Soft, Nontender, Nondistended, No guarding/rebound, Positive bowel sounds  Extremities: No clubbing, No cyanosis, No edema, No calf tenderness  Neuro: Strength equal bilaterally, Mild tremors  Psychiatric: Appropriate mood, Normal affect    LABORATORY STUDIES:  ----------------------------------------             12.8   5.34  )-----------( 199      ( 26 Apr 2024 02:23 )             34.9     04-26    126<L>  |  89<L>  |  11.2  ----------------------------<  92  3.5   |  21.0<L>  |  0.62    Ca    8.7      26 Apr 2024 02:23  Phos  1.9     04-26  Mg     2.3     04-26    TPro  7.0  /  Alb  4.3  /  TBili  1.2  /  DBili  x   /  AST  120<H>  /  ALT  60<H>  /  AlkPhos  91  04-24    LIVER FUNCTIONS - ( 24 Apr 2024 22:04 )  Alb: 4.3 g/dL / Pro: 7.0 g/dL / ALK PHOS: 91 U/L / ALT: 60 U/L / AST: 120 U/L / GGT: x           PT/INR - ( 24 Apr 2024 22:04 )   PT: 12.9 sec;   INR: 1.17 ratio    PTT - ( 24 Apr 2024 22:04 )  PTT:28.4 sec    Urinalysis Basic - ( 26 Apr 2024 02:23 )  Color: x / Appearance: x / SG: x / pH: x  Gluc: 92 mg/dL / Ketone: x  / Bili: x / Urobili: x   Blood: x / Protein: x / Nitrite: x   Leuk Esterase: x / RBC: x / WBC x   Sq Epi: x / Non Sq Epi: x / Bacteria: x    MEDICATIONS  (STANDING):  chlordiazePOXIDE 25 milliGRAM(s) Oral every 8 hours  enoxaparin Injectable 40 milliGRAM(s) SubCutaneous every 24 hours  folic acid 1 milliGRAM(s) Oral daily  influenza   Vaccine 0.5 milliLiter(s) IntraMuscular once  multivitamin 1 Tablet(s) Oral daily  pantoprazole    Tablet 40 milliGRAM(s) Oral before breakfast  thiamine 100 milliGRAM(s) Oral daily    MEDICATIONS  (PRN):  LORazepam   Injectable 2 milliGRAM(s) IV Push every 2 hours PRN Anxiety, Agitation  ondansetron Injectable 4 milliGRAM(s) IV Push every 6 hours PRN Nausea and/or Vomiting  propranolol 10 milliGRAM(s) Oral two times a day PRN Anxiety      ASSESSMENT / PLAN:  ----------------------------------------  38M with a history of anxiety and alcohol abuse who presented after being found on the floor in the hotel with evidence of empty beer cans. He was noted to have a sodium of 113 and was admitted to the intensive care unit. Intravenous fluids were administered in the emergency department with overcorrection in the sodium level requiring administration of dextrose and DDAVP. Repeat studies noted continued improvement in the hyponatremia with fluid restriction.    Hyponatremia  - Suspect secondary to beer potomania  - Continue on fluid restriction  - Close monitoring of sodium levels    Alcohol abuse / Withdrawal  - On chlordiazepoxide with lorazepam as needed  - [facility] protocol in progress  - For supplement of phosphorous  - Thiamine and folic acid supplementation  - Alcohol cessation discussed    Anxiety  - For resumption of sertraline if sodium level continues to improve  - Lorazepam as needed YOBANY ALEX  ----------------------------------------  The patient was seen earlier at bedside. Patient with hyponatremia. Reported feeling anxious. Admitted to nausea. Denied chest pain of palpitations.    37 yo m pmhx ETOH abuse, HTN, anxiety biba after being found altered on floor of hotel room.  Patient had recently gone through 28 day rehab in the [facility].  Patient recently  from his wife, has been living in a hotel for the last 2 weeks.  Patient's aunt calls him daily, didn't hear from him the last day or so, went to [facility] and had police do wellness check.  Patient found on floor of hotel bathroom, surrounded by a bunch of beer cans altered and was promptly brought to ED.  In ED patient found with Na 113, elevated etoh level, lactate 7.1.  Patient given NS 1L and micu consulted.  Repeat Na 113, admit to MICU.      Vital Signs Last 24 Hrs  T(C): 37.1 (26 Apr 2024 03:11), Max: 37.1 (25 Apr 2024 23:00)  T(F): 98.8 (26 Apr 2024 03:11), Max: 98.8 (26 Apr 2024 03:11)  HR: 84 (26 Apr 2024 07:00) (75 - 116)  BP: 109/81 (26 Apr 2024 07:00) (100/73 - 143/102)  BP(mean): 90 (26 Apr 2024 07:00) (82 - 116)  RR: 15 (26 Apr 2024 07:00) (11 - 22)  SpO2: 97% (26 Apr 2024 07:00) (94% - 100%)    Parameters below as of 26 Apr 2024 04:00  Patient On (Oxygen Delivery Method): room air    PHYSICAL EXAMINATION:  ----------------------------------------  General appearance: No acute distress, Awake, Alert  HEENT: Normocephalic, Conjunctiva clear, EOMI, Left eye brow abrasion  Neck: Supple, No JVD, No tenderness  Lungs: Breath sound equal bilaterally, No wheezes, No rales  Cardiovascular: S1S2, Regular rhythm  Abdomen: Soft, Nontender, Nondistended, No guarding/rebound, Positive bowel sounds  Extremities: No clubbing, No cyanosis, No edema, No calf tenderness  Neuro: Strength equal bilaterally, Mild tremors  Psychiatric: Appropriate mood, Normal affect    LABORATORY STUDIES:  ----------------------------------------             12.8   5.34  )-----------( 199      ( 26 Apr 2024 02:23 )             34.9     04-26    126<L>  |  89<L>  |  11.2  ----------------------------<  92  3.5   |  21.0<L>  |  0.62    Ca    8.7      26 Apr 2024 02:23  Phos  1.9     04-26  Mg     2.3     04-26    TPro  7.0  /  Alb  4.3  /  TBili  1.2  /  DBili  x   /  AST  120<H>  /  ALT  60<H>  /  AlkPhos  91  04-24    LIVER FUNCTIONS - ( 24 Apr 2024 22:04 )  Alb: 4.3 g/dL / Pro: 7.0 g/dL / ALK PHOS: 91 U/L / ALT: 60 U/L / AST: 120 U/L / GGT: x           PT/INR - ( 24 Apr 2024 22:04 )   PT: 12.9 sec;   INR: 1.17 ratio    PTT - ( 24 Apr 2024 22:04 )  PTT:28.4 sec    Urinalysis Basic - ( 26 Apr 2024 02:23 )  Color: x / Appearance: x / SG: x / pH: x  Gluc: 92 mg/dL / Ketone: x  / Bili: x / Urobili: x   Blood: x / Protein: x / Nitrite: x   Leuk Esterase: x / RBC: x / WBC x   Sq Epi: x / Non Sq Epi: x / Bacteria: x    MEDICATIONS  (STANDING):  chlordiazePOXIDE 25 milliGRAM(s) Oral every 8 hours  enoxaparin Injectable 40 milliGRAM(s) SubCutaneous every 24 hours  folic acid 1 milliGRAM(s) Oral daily  influenza   Vaccine 0.5 milliLiter(s) IntraMuscular once  multivitamin 1 Tablet(s) Oral daily  pantoprazole    Tablet 40 milliGRAM(s) Oral before breakfast  thiamine 100 milliGRAM(s) Oral daily    MEDICATIONS  (PRN):  LORazepam   Injectable 2 milliGRAM(s) IV Push every 2 hours PRN Anxiety, Agitation  ondansetron Injectable 4 milliGRAM(s) IV Push every 6 hours PRN Nausea and/or Vomiting  propranolol 10 milliGRAM(s) Oral two times a day PRN Anxiety      ASSESSMENT / PLAN:  ----------------------------------------  38M with a history of anxiety and alcohol abuse who presented after being found on the floor in the hotel with evidence of empty beer cans. He was noted to have a sodium of 113 and was admitted to the intensive care unit. Intravenous fluids were administered in the emergency department with overcorrection in the sodium level requiring administration of dextrose and DDAVP. Repeat studies noted continued improvement in the hyponatremia with fluid restriction.    Hyponatremia  - Suspect secondary to beer potomania  - Continue on fluid restriction  - Close monitoring of sodium levels    Alcohol abuse / Withdrawal  - On chlordiazepoxide with lorazepam as needed  - [facility] protocol in progress  - For supplement of phosphorous  - Thiamine and folic acid supplementation  - Alcohol cessation discussed    Anxiety  - For resumption of sertraline if sodium level continues to improve  - Lorazepam as needed

## 2024-04-26 NOTE — CHART NOTE - NSCHARTNOTEFT_GEN_A_CORE
Nutrition Note: Aware pt downgraded from ICU to medicine, awaiting bed. Chart reviewed; RD to follow up with full nutrition assessment per medical floor protocol.

## 2024-04-27 LAB
ANION GAP SERPL CALC-SCNC: 13 MMOL/L — SIGNIFICANT CHANGE UP (ref 5–17)
BUN SERPL-MCNC: 9.9 MG/DL — SIGNIFICANT CHANGE UP (ref 8–20)
CALCIUM SERPL-MCNC: 8.7 MG/DL — SIGNIFICANT CHANGE UP (ref 8.4–10.5)
CHLORIDE SERPL-SCNC: 90 MMOL/L — LOW (ref 96–108)
CO2 SERPL-SCNC: 24 MMOL/L — SIGNIFICANT CHANGE UP (ref 22–29)
CREAT SERPL-MCNC: 0.69 MG/DL — SIGNIFICANT CHANGE UP (ref 0.5–1.3)
EGFR: 121 ML/MIN/1.73M2 — SIGNIFICANT CHANGE UP
GLUCOSE SERPL-MCNC: 97 MG/DL — SIGNIFICANT CHANGE UP (ref 70–99)
HCT VFR BLD CALC: 32 % — LOW (ref 39–50)
HGB BLD-MCNC: 11.8 G/DL — LOW (ref 13–17)
MAGNESIUM SERPL-MCNC: 2.3 MG/DL — SIGNIFICANT CHANGE UP (ref 1.6–2.6)
MCHC RBC-ENTMCNC: 30.8 PG — SIGNIFICANT CHANGE UP (ref 27–34)
MCHC RBC-ENTMCNC: 36.9 GM/DL — HIGH (ref 32–36)
MCV RBC AUTO: 83.6 FL — SIGNIFICANT CHANGE UP (ref 80–100)
PHOSPHATE SERPL-MCNC: 3.1 MG/DL — SIGNIFICANT CHANGE UP (ref 2.4–4.7)
PLATELET # BLD AUTO: 172 K/UL — SIGNIFICANT CHANGE UP (ref 150–400)
POTASSIUM SERPL-MCNC: 3.3 MMOL/L — LOW (ref 3.5–5.3)
POTASSIUM SERPL-SCNC: 3.3 MMOL/L — LOW (ref 3.5–5.3)
RBC # BLD: 3.83 M/UL — LOW (ref 4.2–5.8)
RBC # FLD: 13.7 % — SIGNIFICANT CHANGE UP (ref 10.3–14.5)
SODIUM SERPL-SCNC: 127 MMOL/L — LOW (ref 135–145)
WBC # BLD: 5.47 K/UL — SIGNIFICANT CHANGE UP (ref 3.8–10.5)
WBC # FLD AUTO: 5.47 K/UL — SIGNIFICANT CHANGE UP (ref 3.8–10.5)

## 2024-04-27 PROCEDURE — 99233 SBSQ HOSP IP/OBS HIGH 50: CPT

## 2024-04-27 RX ORDER — SERTRALINE 25 MG/1
75 TABLET, FILM COATED ORAL DAILY
Refills: 0 | Status: DISCONTINUED | OUTPATIENT
Start: 2024-04-27 | End: 2024-04-28

## 2024-04-27 RX ORDER — POTASSIUM CHLORIDE 20 MEQ
40 PACKET (EA) ORAL EVERY 4 HOURS
Refills: 0 | Status: COMPLETED | OUTPATIENT
Start: 2024-04-27 | End: 2024-04-27

## 2024-04-27 RX ORDER — LANOLIN ALCOHOL/MO/W.PET/CERES
5 CREAM (GRAM) TOPICAL AT BEDTIME
Refills: 0 | Status: DISCONTINUED | OUTPATIENT
Start: 2024-04-27 | End: 2024-04-28

## 2024-04-27 RX ADMIN — Medication 25 MILLIGRAM(S): at 05:10

## 2024-04-27 RX ADMIN — Medication 5 MILLIGRAM(S): at 21:43

## 2024-04-27 RX ADMIN — ENOXAPARIN SODIUM 40 MILLIGRAM(S): 100 INJECTION SUBCUTANEOUS at 05:10

## 2024-04-27 RX ADMIN — ONDANSETRON 4 MILLIGRAM(S): 8 TABLET, FILM COATED ORAL at 15:58

## 2024-04-27 RX ADMIN — Medication 100 MILLIGRAM(S): at 12:15

## 2024-04-27 RX ADMIN — Medication 25 MILLIGRAM(S): at 13:47

## 2024-04-27 RX ADMIN — Medication 2 MILLIGRAM(S): at 10:27

## 2024-04-27 RX ADMIN — PANTOPRAZOLE SODIUM 40 MILLIGRAM(S): 20 TABLET, DELAYED RELEASE ORAL at 05:10

## 2024-04-27 RX ADMIN — Medication 40 MILLIEQUIVALENT(S): at 13:47

## 2024-04-27 RX ADMIN — Medication 2 MILLIGRAM(S): at 03:17

## 2024-04-27 RX ADMIN — ONDANSETRON 4 MILLIGRAM(S): 8 TABLET, FILM COATED ORAL at 03:17

## 2024-04-27 RX ADMIN — Medication 1 TABLET(S): at 12:16

## 2024-04-27 RX ADMIN — Medication 1 MILLIGRAM(S): at 12:15

## 2024-04-27 RX ADMIN — Medication 40 MILLIEQUIVALENT(S): at 09:51

## 2024-04-27 RX ADMIN — SERTRALINE 75 MILLIGRAM(S): 25 TABLET, FILM COATED ORAL at 14:17

## 2024-04-27 NOTE — PROGRESS NOTE ADULT - ASSESSMENT
38M with a history of anxiety and alcohol abuse who presented after being found on the floor in the hotel with evidence of empty beer cans. He was noted to have a sodium of 113 and was admitted to the intensive care unit. Intravenous fluids were administered in the emergency department with overcorrection in the sodium level requiring administration of dextrose and DDAVP. Repeat studies noted continued improvement in the hyponatremia.  patient notes binge drinking due to life stressors.     Hyponatremia  - Suspect secondary to beer potomania  monitor off fluids  - Close monitoring of sodium levels    Alcohol abuse intoxication  monitor for withdrawal   -ciwa with prn lorazepam as needed  - Thiamine and folic acid supplementation  - Alcohol cessation discussed    Anxiety  - resumption of sertraline  - Lorazepam as needed    SW eval   dc planning likely 24-48 hrs

## 2024-04-27 NOTE — PROGRESS NOTE ADULT - SUBJECTIVE AND OBJECTIVE BOX
seen for hyponatremia    no acute complaints/events  tolerating diet  ambulating in room   ros negative     MEDICATIONS  (STANDING):  enoxaparin Injectable 40 milliGRAM(s) SubCutaneous every 24 hours  folic acid 1 milliGRAM(s) Oral daily  influenza   Vaccine 0.5 milliLiter(s) IntraMuscular once  multivitamin 1 Tablet(s) Oral daily  pantoprazole    Tablet 40 milliGRAM(s) Oral before breakfast  sertraline 75 milliGRAM(s) Oral daily  thiamine 100 milliGRAM(s) Oral daily    MEDICATIONS  (PRN):  LORazepam     Tablet 2 milliGRAM(s) Oral four times a day PRN Anxiety  LORazepam   Injectable 2 milliGRAM(s) IV Push every 2 hours PRN Anxiety, Agitation  ondansetron Injectable 4 milliGRAM(s) IV Push every 6 hours PRN Nausea and/or Vomiting  propranolol 10 milliGRAM(s) Oral two times a day PRN Anxiety      Allergies    No Known Allergies      Vital Signs Last 24 Hrs  T(C): 36.9 (27 Apr 2024 12:09), Max: 37.2 (26 Apr 2024 23:17)  T(F): 98.4 (27 Apr 2024 12:09), Max: 98.9 (26 Apr 2024 23:17)  HR: 95 (27 Apr 2024 12:09) (84 - 106)  BP: 124/82 (27 Apr 2024 12:09) (114/80 - 145/98)  BP(mean): 105 (27 Apr 2024 01:30) (91 - 116)  RR: 18 (27 Apr 2024 08:30) (15 - 21)  SpO2: 98% (27 Apr 2024 12:09) (97% - 100%)    Parameters below as of 27 Apr 2024 12:09  Patient On (Oxygen Delivery Method): room air        PHYSICAL EXAM:    GENERAL: NAD  CHEST/LUNG: Clear to ausculation bilaterally  HEART: Regular rate and rhythm; S1 S2  ABDOMEN: Soft, Nontender,  Bowel sounds present  EXTREMITIES: no edema   NERVOUS SYSTEM:  Alert & Oriented X3,  Motor Strength 5/5 B/L upper and lower extremities      LABS:                        11.8   5.47  )-----------( 172      ( 27 Apr 2024 06:20 )             32.0     04-27    127<L>  |  90<L>  |  9.9  ----------------------------<  97  3.3<L>   |  24.0  |  0.69    Ca    8.7      27 Apr 2024 06:20  Phos  3.1     04-27  Mg     2.3     04-27        Urinalysis Basic - ( 27 Apr 2024 06:20 )    Color: x / Appearance: x / SG: x / pH: x  Gluc: 97 mg/dL / Ketone: x  / Bili: x / Urobili: x   Blood: x / Protein: x / Nitrite: x   Leuk Esterase: x / RBC: x / WBC x   Sq Epi: x / Non Sq Epi: x / Bacteria: x        CAPILLARY BLOOD GLUCOSE            RADIOLOGY & ADDITIONAL TESTS:

## 2024-04-27 NOTE — PROGRESS NOTE ADULT - REASON FOR ADMISSION
ETOH withdrawal/Hyponatremia

## 2024-04-28 VITALS
TEMPERATURE: 98 F | HEART RATE: 84 BPM | DIASTOLIC BLOOD PRESSURE: 83 MMHG | RESPIRATION RATE: 18 BRPM | OXYGEN SATURATION: 98 % | SYSTOLIC BLOOD PRESSURE: 125 MMHG

## 2024-04-28 LAB
ALBUMIN SERPL ELPH-MCNC: 3.5 G/DL — SIGNIFICANT CHANGE UP (ref 3.3–5.2)
ALP SERPL-CCNC: 99 U/L — SIGNIFICANT CHANGE UP (ref 40–120)
ALT FLD-CCNC: 44 U/L — HIGH
ANION GAP SERPL CALC-SCNC: 14 MMOL/L — SIGNIFICANT CHANGE UP (ref 5–17)
AST SERPL-CCNC: 50 U/L — HIGH
BILIRUB DIRECT SERPL-MCNC: 0.2 MG/DL — SIGNIFICANT CHANGE UP (ref 0–0.3)
BILIRUB INDIRECT FLD-MCNC: 0.3 MG/DL — SIGNIFICANT CHANGE UP (ref 0.2–1)
BILIRUB SERPL-MCNC: 0.5 MG/DL — SIGNIFICANT CHANGE UP (ref 0.4–2)
BUN SERPL-MCNC: 12.4 MG/DL — SIGNIFICANT CHANGE UP (ref 8–20)
CALCIUM SERPL-MCNC: 8.5 MG/DL — SIGNIFICANT CHANGE UP (ref 8.4–10.5)
CHLORIDE SERPL-SCNC: 95 MMOL/L — LOW (ref 96–108)
CO2 SERPL-SCNC: 22 MMOL/L — SIGNIFICANT CHANGE UP (ref 22–29)
CREAT SERPL-MCNC: 0.67 MG/DL — SIGNIFICANT CHANGE UP (ref 0.5–1.3)
EGFR: 123 ML/MIN/1.73M2 — SIGNIFICANT CHANGE UP
GLUCOSE SERPL-MCNC: 92 MG/DL — SIGNIFICANT CHANGE UP (ref 70–99)
LDH SERPL L TO P-CCNC: 179 U/L — SIGNIFICANT CHANGE UP (ref 98–192)
MAGNESIUM SERPL-MCNC: 2.2 MG/DL — SIGNIFICANT CHANGE UP (ref 1.6–2.6)
POTASSIUM SERPL-MCNC: 3.4 MMOL/L — LOW (ref 3.5–5.3)
POTASSIUM SERPL-SCNC: 3.4 MMOL/L — LOW (ref 3.5–5.3)
PROT SERPL-MCNC: 6.3 G/DL — LOW (ref 6.6–8.7)
SODIUM SERPL-SCNC: 131 MMOL/L — LOW (ref 135–145)

## 2024-04-28 PROCEDURE — 87640 STAPH A DNA AMP PROBE: CPT

## 2024-04-28 PROCEDURE — 87637 SARSCOV2&INF A&B&RSV AMP PRB: CPT

## 2024-04-28 PROCEDURE — 72125 CT NECK SPINE W/O DYE: CPT | Mod: MC

## 2024-04-28 PROCEDURE — 80053 COMPREHEN METABOLIC PANEL: CPT

## 2024-04-28 PROCEDURE — 82330 ASSAY OF CALCIUM: CPT

## 2024-04-28 PROCEDURE — 71045 X-RAY EXAM CHEST 1 VIEW: CPT

## 2024-04-28 PROCEDURE — 83935 ASSAY OF URINE OSMOLALITY: CPT

## 2024-04-28 PROCEDURE — 86703 HIV-1/HIV-2 1 RESULT ANTBDY: CPT

## 2024-04-28 PROCEDURE — 85730 THROMBOPLASTIN TIME PARTIAL: CPT

## 2024-04-28 PROCEDURE — 99239 HOSP IP/OBS DSCHRG MGMT >30: CPT

## 2024-04-28 PROCEDURE — 80076 HEPATIC FUNCTION PANEL: CPT

## 2024-04-28 PROCEDURE — 82803 BLOOD GASES ANY COMBINATION: CPT

## 2024-04-28 PROCEDURE — 82947 ASSAY GLUCOSE BLOOD QUANT: CPT

## 2024-04-28 PROCEDURE — 83615 LACTATE (LD) (LDH) ENZYME: CPT

## 2024-04-28 PROCEDURE — 70450 CT HEAD/BRAIN W/O DYE: CPT | Mod: MC

## 2024-04-28 PROCEDURE — 93005 ELECTROCARDIOGRAM TRACING: CPT

## 2024-04-28 PROCEDURE — 84295 ASSAY OF SERUM SODIUM: CPT

## 2024-04-28 PROCEDURE — 83735 ASSAY OF MAGNESIUM: CPT

## 2024-04-28 PROCEDURE — 84300 ASSAY OF URINE SODIUM: CPT

## 2024-04-28 PROCEDURE — 87641 MR-STAPH DNA AMP PROBE: CPT

## 2024-04-28 PROCEDURE — 80048 BASIC METABOLIC PNL TOTAL CA: CPT

## 2024-04-28 PROCEDURE — 85027 COMPLETE CBC AUTOMATED: CPT

## 2024-04-28 PROCEDURE — 82962 GLUCOSE BLOOD TEST: CPT

## 2024-04-28 PROCEDURE — 90715 TDAP VACCINE 7 YRS/> IM: CPT

## 2024-04-28 PROCEDURE — 80307 DRUG TEST PRSMV CHEM ANLYZR: CPT

## 2024-04-28 PROCEDURE — 85018 HEMOGLOBIN: CPT

## 2024-04-28 PROCEDURE — 82435 ASSAY OF BLOOD CHLORIDE: CPT

## 2024-04-28 PROCEDURE — 83605 ASSAY OF LACTIC ACID: CPT

## 2024-04-28 PROCEDURE — 85610 PROTHROMBIN TIME: CPT

## 2024-04-28 PROCEDURE — 83690 ASSAY OF LIPASE: CPT

## 2024-04-28 PROCEDURE — 85014 HEMATOCRIT: CPT

## 2024-04-28 PROCEDURE — 81003 URINALYSIS AUTO W/O SCOPE: CPT

## 2024-04-28 PROCEDURE — 84132 ASSAY OF SERUM POTASSIUM: CPT

## 2024-04-28 PROCEDURE — 36415 COLL VENOUS BLD VENIPUNCTURE: CPT

## 2024-04-28 PROCEDURE — 83930 ASSAY OF BLOOD OSMOLALITY: CPT

## 2024-04-28 PROCEDURE — 82550 ASSAY OF CK (CPK): CPT

## 2024-04-28 PROCEDURE — 99285 EMERGENCY DEPT VISIT HI MDM: CPT

## 2024-04-28 PROCEDURE — 82553 CREATINE MB FRACTION: CPT

## 2024-04-28 PROCEDURE — 12001 RPR S/N/AX/GEN/TRNK 2.5CM/<: CPT

## 2024-04-28 PROCEDURE — 84100 ASSAY OF PHOSPHORUS: CPT

## 2024-04-28 PROCEDURE — 85025 COMPLETE CBC W/AUTO DIFF WBC: CPT

## 2024-04-28 RX ORDER — POTASSIUM CHLORIDE 20 MEQ
40 PACKET (EA) ORAL ONCE
Refills: 0 | Status: COMPLETED | OUTPATIENT
Start: 2024-04-28 | End: 2024-04-28

## 2024-04-28 RX ORDER — SERTRALINE 25 MG/1
1.5 TABLET, FILM COATED ORAL
Qty: 0 | Refills: 0 | DISCHARGE

## 2024-04-28 RX ADMIN — SERTRALINE 75 MILLIGRAM(S): 25 TABLET, FILM COATED ORAL at 09:07

## 2024-04-28 RX ADMIN — PANTOPRAZOLE SODIUM 40 MILLIGRAM(S): 20 TABLET, DELAYED RELEASE ORAL at 05:09

## 2024-04-28 RX ADMIN — Medication 40 MILLIEQUIVALENT(S): at 10:53

## 2024-04-28 RX ADMIN — ENOXAPARIN SODIUM 40 MILLIGRAM(S): 100 INJECTION SUBCUTANEOUS at 05:08

## 2024-04-28 RX ADMIN — Medication 1 MILLIGRAM(S): at 09:07

## 2024-04-28 RX ADMIN — Medication 100 MILLIGRAM(S): at 09:07

## 2024-04-28 RX ADMIN — Medication 1 TABLET(S): at 09:08

## 2024-04-28 RX ADMIN — Medication 2 MILLIGRAM(S): at 02:13

## 2024-04-28 NOTE — DISCHARGE NOTE NURSING/CASE MANAGEMENT/SOCIAL WORK - PATIENT PORTAL LINK FT
You can access the FollowMyHealth Patient Portal offered by [facility] by registering at the following website: [email or web address]. By joining [employer]’s FollowMyHealth portal, you will also be able to view your health information using other applications (apps) compatible with our system.

## 2024-04-28 NOTE — DISCHARGE NOTE PROVIDER - HOSPITAL COURSE
38M with a history of anxiety and alcohol abuse who presented after being found on the floor in the hotel with evidence of empty beer cans. He was noted to have a sodium of 113 and was admitted to the intensive care unit. Intravenous fluids were administered in the emergency department with overcorrection in the sodium level requiring administration of dextrose and DDAVP. Repeat studies noted continued improvement in the hyponatremia.  patient notes binge drinking due to life stressors.     improved Na and overall clinical condition.  seen by SW for etoh abuse.    stable for dc home.

## 2024-04-28 NOTE — DISCHARGE NOTE NURSING/CASE MANAGEMENT/SOCIAL WORK - NSDCVIVACCINE_GEN_ALL_CORE_FT
Tdap; 24-Apr-2024 23:29; Dominick Royal (JAKE); [employer]; [id number] (Exp. Date: 20-Jul-2025); IntraMuscular; Deltoid Right.; 0.5 milliLiter(s); VIS (VIS Published: 09-May-2013, VIS Presented: 24-Apr-2024);

## 2024-04-28 NOTE — DISCHARGE NOTE PROVIDER - NSDCMRMEDTOKEN_GEN_ALL_CORE_FT
folic acid 1 mg oral tablet: 1 tab(s) orally once a day  gabapentin 300 mg oral tablet: orally 2 times a day  naltrexone 50 mg oral tablet: 1 tab(s) orally once a day in the morning  omeprazole 40 mg oral delayed release capsule: 1 cap(s) orally once a day  propranolol 10 mg oral tablet: 1 tab(s) orally 2 times a day as needed for  anxiety  sertraline 50 mg oral tablet: 1.5 tab(s) orally once a day  traZODone 50 mg oral tablet: 50 orally once a day (at bedtime) &quot;Take 1-2 tabs by mouth at bedtime as needed

## 2024-04-28 NOTE — DISCHARGE NOTE PROVIDER - NSDCCPCAREPLAN_GEN_ALL_CORE_FT
PRINCIPAL DISCHARGE DIAGNOSIS  Diagnosis: Hyponatremia  Assessment and Plan of Treatment: resolved  maintain diet        SECONDARY DISCHARGE DIAGNOSES  Diagnosis: Alcohol intoxication  Assessment and Plan of Treatment: avoid alcohol

## 2024-04-28 NOTE — DISCHARGE NOTE PROVIDER - ATTENDING DISCHARGE PHYSICAL EXAMINATION:
aaox3 nad   rrr s1s2  ctab  soft abdomen  no LE edema  nonfocal neuro  ambulatory  ros negative. feels well
